# Patient Record
Sex: FEMALE | Race: WHITE | Employment: FULL TIME | URBAN - METROPOLITAN AREA
[De-identification: names, ages, dates, MRNs, and addresses within clinical notes are randomized per-mention and may not be internally consistent; named-entity substitution may affect disease eponyms.]

---

## 2017-02-15 ENCOUNTER — TELEPHONE (OUTPATIENT)
Dept: GASTROENTEROLOGY | Age: 42
End: 2017-02-15

## 2021-03-23 ENCOUNTER — HOSPITAL ENCOUNTER (EMERGENCY)
Age: 46
Discharge: HOME | End: 2021-03-23
Payer: COMMERCIAL

## 2021-03-23 VITALS
OXYGEN SATURATION: 100 % | DIASTOLIC BLOOD PRESSURE: 78 MMHG | SYSTOLIC BLOOD PRESSURE: 130 MMHG | HEART RATE: 82 BPM | RESPIRATION RATE: 18 BRPM | TEMPERATURE: 98.1 F
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
## 2021-03-23 DIAGNOSIS — M79.662 PAIN OF LEFT CALF: ICD-10-CM

## 2021-03-23 PROCEDURE — 99283 EMERGENCY DEPT VISIT LOW MDM: CPT

## 2021-03-23 RX ORDER — FLUCONAZOLE 150 MG/1
150 TABLET ORAL ONCE
Qty: 1 TABLET | Refills: 0 | Status: SHIPPED | OUTPATIENT
Start: 2021-03-23 | End: 2021-03-23

## 2021-03-23 RX ORDER — METHOCARBAMOL 500 MG/1
500 TABLET, FILM COATED ORAL 4 TIMES DAILY
Qty: 20 TABLET | Refills: 0 | Status: SHIPPED | OUTPATIENT
Start: 2021-03-23 | End: 2021-05-06

## 2021-03-23 RX ORDER — HYDROCODONE BITARTRATE AND ACETAMINOPHEN 5; 325 MG/1; MG/1
1 TABLET ORAL EVERY 6 HOURS PRN
Qty: 8 TABLET | Refills: 0 | Status: SHIPPED | OUTPATIENT
Start: 2021-03-23 | End: 2021-03-26

## 2021-03-23 ASSESSMENT — PAIN DESCRIPTION - PAIN TYPE: TYPE: ACUTE PAIN

## 2021-03-23 NOTE — ED PROVIDER NOTES
As physician-in-triage, I performed a medical screening history and physical exam on this patient. HISTORY OF PRESENT ILLNESS  Jason Delarosa is a 50 y.o. female presents to the emergency department with a dog bite wound to her left calf. States she was seen in urgent care when it first happened. States she is in the ED because she think she may be having a reaction to the antibiotic as she states she is having itching. She also states she feels like the wound looks mildly worse. She did have an updated tetanus shot when she was seen at urgent care. No fevers. .      PHYSICAL EXAM  /86   Pulse 88   Temp 98.1 °F (36.7 °C)   Resp 21   SpO2 100%     On exam, the patient appears in no acute distress. Speech is clear. Breathing is unlabored. Moves all extremities    Comment: Please note this report has been produced using speech recognition software and may contain errors related to that system including errors in grammar, punctuation, and spelling, as well as words and phrases that may be inappropriate. If there are any questions or concerns please feel free to contact the dictating provider for clarification.        Megan May MD  03/23/21 0649

## 2021-03-23 NOTE — ED PROVIDER NOTES
eMERGENCY dEPARTMENT eNCOUnter      PCP: No primary care provider on file. CHIEF COMPLAINT    Chief Complaint   [facility]   [facility]     left calf, 3/18/01663; was seen and treated on 3/19 on urgent care       [facility]    Jayesh Melo Sample is a 50 y.o. female who presents for evaluation of dog bite wound to left calf. Injury occurred 2 days ago and she was seen in urgent care when this happened. She was updated on her tetanus immunization and started on doxycycline and metronidazole. She has been taking these medications as prescribed. She states since then she has had continued pain in his left calf, some itching. She states that at times she will have radiating pain into her left upper leg. Symptoms worsen after standing on her feet and working for a long day. She has had no drainage from wounds. Denies significant redness or streaking of erythema. No fevers, nausea, vomiting. She states that bruising has worsened in the leg. Denies use of blood thinning medications. Patient also requesting a medication as she believes she is developing a yeast infection with above antibiotic use.       REVIEW OF SYSTEMS    Constitutional:  Denies fever, chills, weight loss or weakness   HENT:  Denies sore throat or ear pain   Cardiovascular:  Denies chest pain, palpitations   Respiratory:  Denies cough or shortness of breath    GI:  Denies abdominal pain, nausea, vomiting, or diarrhea  :  Denies any urinary symptoms    Musculoskeletal:  Denies back pain   Skin:  Denies rash  Neurologic:  Denies headache, focal weakness or sensory changes   Endocrine:  Denies polyuria or polydypsia   Lymphatic:  Denies swollen glands     All other review of systems are negative  See HPI and nursing notes for additional information     PAST MEDICAL AND SURGICAL HISTORY    Past Medical History:   Diagnosis Date    Asthma    [facility]     old chart also gives hx panic attacks    Gastritis 4/18/2012    Hiatal hernia organization: None     Attends meetings of clubs or organizations: None     Relationship status: None    Intimate partner violence     Fear of current or ex partner: None     Emotionally abused: None     Physically abused: None     Forced sexual activity: None   Other Topics Concern    None   Social History Narrative    None     Family History   Problem Relation Age of Onset    Asthma Father         and copd    Diabetes Father     Asthma Daughter          PHYSICAL EXAM    VITAL SIGNS: /86   Pulse 88   Temp 98.1 °F (36.7 °C)   Resp 21   SpO2 100%    Constitutional:  Well developed, Well nourished  HENT:  Normocephalic, Atraumatic, PERRL. EOMI. Sclera clear. Conjunctiva normal, No discharge. Neck/Lymphatics: supple, no JVD, no swollen nodes  Cardiovascular:  Normal heart rate, Normal rhythm, No murmurs  Respiratory:  Nonlabored breathing. Normal breath sounds, No wheezing  Abdomen: Bowel sounds normal, Soft, No tenderness, no masses. Musculoskeletal:   Left calf with several puncture wounds which appear to be healing well with overlying scabbing. Near the entire posterior medial aspect of left calf with bruising. Compartments are soft. Achilles tendon is intact clinically. Sensation intact throughout. Full range of motion of left knee and ankle. Dorsalis pedis and posterior tibial pulse 2+. Brisk capillary refill. Integument:  Warm, Dry  Neurologic: Alert & oriented , No focal deficits noted. Cranial nerves II through XII grossly intact. Normal gross motor coordination & motor strength bilateral upper and lower extremities  Sensation intact. Psychiatric:  Affect normal, Mood normal.       ED COURSE & MEDICAL DECISION MAKING       Vital signs and nursing notes reviewed during ED course. I have independently evaluated this patient . Supervising MD present in the Emergency Department, available for consultation, throughout entirety of  patient care.      Patient presents as above for recheck of dog bite wound to left calf. Wound does appear to be healing well, not see any evidence of infection. No crepitus streaking of erythema, surrounding induration or erythema. Achilles tendon is clinically intact. She is on any blood thinning medications. She states that the swelling has actually improved in her left calf and compartments are soft. Discussed importance of continuing antibiotics as prescribed. Will discharge with Diflucan, short course of pain medication, muscle relaxing medication. We discussed elevation of extremity and use of compression stockings while at work as standing on her feet throughout the day worsens the swelling and pain. She will need a repeat wound recheck in the next 2 to 3 days, however should immediately return with new or worsening symptoms or signs of systemic or localized infection. Patient agreeable with this plan and comfortable with discharge. The patient and/or the family were informed of the results of any tests/labs/imaging, the treatment plan, and time was allotted to answer questions. Clinical  IMPRESSION    1. Dog bite, subsequent encounter    2. Pain of left calf          Comment: Please note this report has been produced using speech recognition software and may contain errors related to that system including errors in grammar, punctuation, and spelling, as well as words and phrases that may be inappropriate. If there are any questions or concerns please feel free to contact the dictating provider for clarification.           FAUSTINO Moon  03/23/21 3193

## 2021-03-25 ENCOUNTER — OFFICE VISIT (OUTPATIENT)
Dept: PRIMARY CARE CLINIC | Age: 46
End: 2021-03-25
Payer: COMMERCIAL

## 2021-03-25 VITALS
HEART RATE: 100 BPM | DIASTOLIC BLOOD PRESSURE: 62 MMHG | TEMPERATURE: 97 F | WEIGHT: 201.4 LBS | BODY MASS INDEX: 34.38 KG/M2 | OXYGEN SATURATION: 99 % | SYSTOLIC BLOOD PRESSURE: 100 MMHG | HEIGHT: 64 IN
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
## 2021-03-25 PROCEDURE — 1036F TOBACCO NON-USER: CPT | Performed by: PHYSICIAN ASSISTANT

## 2021-03-25 PROCEDURE — G8484 FLU IMMUNIZE NO ADMIN: HCPCS | Performed by: PHYSICIAN ASSISTANT

## 2021-03-25 PROCEDURE — G8417 CALC BMI ABV UP PARAM F/U: HCPCS | Performed by: PHYSICIAN ASSISTANT

## 2021-03-25 PROCEDURE — G8427 DOCREV CUR MEDS BY ELIG CLIN: HCPCS | Performed by: PHYSICIAN ASSISTANT

## 2021-03-25 PROCEDURE — 99212 OFFICE O/P EST SF 10 MIN: CPT | Performed by: PHYSICIAN ASSISTANT

## 2021-03-25 RX ORDER — DOXYCYCLINE 100 MG/1
100 TABLET ORAL 2 TIMES DAILY
COMMUNITY
Start: 2021-03-18 | End: 2021-03-25

## 2021-03-25 RX ORDER — METRONIDAZOLE 500 MG/1
500 TABLET ORAL 3 TIMES DAILY
COMMUNITY
Start: 2021-03-18 | End: 2021-03-25

## 2021-03-25 ASSESSMENT — PATIENT HEALTH QUESTIONNAIRE - PHQ9
SUM OF ALL RESPONSES TO PHQ9 QUESTIONS 1 & 2: 0
SUM OF ALL RESPONSES TO PHQ QUESTIONS 1-9: 0
SUM OF ALL RESPONSES TO PHQ QUESTIONS 1-9: 0

## 2021-03-25 NOTE — PROGRESS NOTES
3/25/2021    Ashleyedeo Combe Sample    Chief Complaint   Patient presents with   [facility] ED Follow-up     dog bite       HPI  History was obtained from patient. Tim Crouch is a 50 y.o. female who presents today for follow-up on dog bite from 4 days ago. Patient was bitten on left calf by a neighborhood dog on 3/21/2021. She presented to an urgent care immediately following the bite and her tetanus was updated. She was placed on doxycycline and Flagyl. 2 days later, she returned to our emergency department due to itching of the leg and concerns for antibiotic induced yeast infection. She was sent home from our emergency room with Diflucan, Norco, Robaxin and told to continue dual therapy antibiotics. Patient states that she is still taking the antibiotics. She states that the wound has improved and her pain has lessened. Bruising present. She still has mild to moderate pain which is worse with weightbearing. She is back to work at [employer] and is on her feet for long periods of time. She denies fever, chills, nausea, vomiting, weakness, loss of sensation, cold skin, chest pain, dyspnea, cough, or any other complaints.      PAST MEDICAL HISTORY  Past Medical History:   Diagnosis Date    Asthma    [facility] Depression     old chart also gives hx panic attacks    Gastritis 4/18/2012    Hiatal hernia     Migraines     Ovarian cyst     Panic attacks        FAMILY HISTORY  Family History   Problem Relation Age of Onset    Asthma Father         and copd    Diabetes Father     Asthma Daughter        SOCIAL HISTORY  Social History     Socioeconomic History    Marital status: Single     Spouse name: None    Number of children: None    Years of education: None    Highest education level: None   Occupational History    None   Social Needs    Financial resource strain: None    Food insecurity     Worry: None     Inability: None    Transportation needs     Medical: None     Non-medical: None   Tobacco Use    Smoking status: Never Smoker    Smokeless tobacco: Never Used   Substance and Sexual Activity    Alcohol use: Yes     Alcohol/week: 2.0 standard drinks     Types: 2 Cans of beer per week     Comment: average- one or two times per month\"    Drug use: No    Sexual activity: Never   Lifestyle    Physical activity     Days per week: None     Minutes per session: None    Stress: None   Relationships    Social connections     Talks on phone: None     Gets together: None     Attends [employer] service: None     Active member of club or organization: None     Attends meetings of clubs or organizations: None     Relationship status: None    Intimate partner violence     Fear of current or ex partner: None     Emotionally abused: None     Physically abused: None     Forced sexual activity: None   Other Topics Concern    None   Social History Narrative    None        SURGICAL HISTORY  Past Surgical History:   Procedure Laterality Date    CHOLECYSTECTOMY  5/9/12    Laprascopic    HYSTERECTOMY      LAVH,cysto done 12/2008(pc)    TUBAL LIGATION  2000       CURRENT MEDICATIONS  Current Outpatient Medications   Medication Sig Dispense Refill    metroNIDAZOLE (FLAGYL) 500 MG tablet Take 500 mg by mouth 3 times daily      doxycycline monohydrate (ADOXA) 100 MG tablet Take 100 mg by mouth 2 times daily      HYDROcodone-acetaminophen (NORCO) 5-325 MG per tablet Take 1 tablet by mouth every 6 hours as needed for Pain for up to 3 days. 8 tablet 0    methocarbamol (ROBAXIN) 500 MG tablet Take 1 tablet by mouth 4 times daily As needed for muscle spasm. 20 tablet 0    ibuprofen (ADVIL;MOTRIN) 800 MG tablet Take 1 tablet by mouth every 6 hours as needed for Pain (Patient not taking: Reported on 3/25/2021) 120 tablet 3    omeprazole (PRILOSEC) 20 MG capsule Take 20 mg by mouth daily. No current facility-administered medications for this visit.         ALLERGIES  Allergies   Allergen Reactions    Lomotil occurred.     Electronically signed by Missy Storey PA-C on 3/25/2021

## 2021-03-25 NOTE — PATIENT INSTRUCTIONS
Apply ice intermittently never directly to the skin  Ibuprofen 800 mg every 8 hours as needed  Continue antibiotics until completion and pain medicine as needed  Elevate at home  Firm compress when weight bearing  Establish care with PCP ore return to clinic as needed      Patient Education        Animal Bites: Care Instructions  Your Care Instructions  After an animal bite, the biggest concern is infection. The chance of infection depends on the type of animal that bit you, where on your body you were bitten, and your general health. Many animal bites are not closed with stitches, because this can increase the chance of infection. Your bite may take as little as 7 days or as long as several months to heal, depending on how bad it is. Taking good care of your wound at home will help it heal and reduce your chance of infection. The doctor has checked you carefully, but problems can develop later. If you notice any problems or new symptoms, get medical treatment right away. Follow-up care is a key part of your treatment and safety. Be sure to make and go to all appointments, and call your doctor if you are having problems. It's also a good idea to know your test results and keep a list of the medicines you take. How can you care for yourself at home? · If your doctor told you how to care for your wound, follow your doctor's instructions. If you did not get instructions, follow this general advice:  ? After 24 to 48 hours, gently wash the wound with clean water 2 times a day. Do not scrub or soak the wound. Don't use hydrogen peroxide or alcohol, which can slow healing. ? You may cover the wound with a thin layer of petroleum jelly, such as Vaseline, and a nonstick bandage. ? Apply more petroleum jelly and replace the bandage as needed. · After you shower, gently dry the wound with a clean towel. · If your doctor has closed the wound, cover the bandage with a plastic bag before you take a shower.   · A small Information box to learn more about \"Animal Bites: Care Instructions. \"     If you do not have an account, please click on the \"Sign Up Now\" link. Current as of: February 26, 2020               Content Version: 12.8  © 4622-7623 [employer]. Care instructions adapted under license by [facility] [place] ([facility]). If you have questions about a medical condition or this instruction, always ask your healthcare professional. [address] any warranty or liability for your use of this information.

## 2021-04-28 ENCOUNTER — OFFICE VISIT (OUTPATIENT)
Dept: ORTHOPEDIC SURGERY | Age: 46
End: 2021-04-28
Payer: COMMERCIAL

## 2021-04-28 VITALS — HEIGHT: 60 IN | WEIGHT: 201 LBS | BODY MASS INDEX: 39.46 KG/M2 | HEART RATE: 89 BPM | OXYGEN SATURATION: 98 %

## 2021-04-28 DIAGNOSIS — M76.72 PERONEAL TENDONITIS OF LEFT LOWER LEG: Primary | ICD-10-CM

## 2021-04-28 PROCEDURE — 99202 OFFICE O/P NEW SF 15 MIN: CPT | Performed by: PHYSICIAN ASSISTANT

## 2021-04-28 PROCEDURE — G8417 CALC BMI ABV UP PARAM F/U: HCPCS | Performed by: PHYSICIAN ASSISTANT

## 2021-04-28 PROCEDURE — 1036F TOBACCO NON-USER: CPT | Performed by: PHYSICIAN ASSISTANT

## 2021-04-28 PROCEDURE — G8427 DOCREV CUR MEDS BY ELIG CLIN: HCPCS | Performed by: PHYSICIAN ASSISTANT

## 2021-04-28 NOTE — PATIENT INSTRUCTIONS
MRI left ankle  Take ibuprofen regularly, 2-3 times per day  Call office once MRI complete to get follow-up visit.

## 2021-04-28 NOTE — PROGRESS NOTES
Review of Systems   Constitutional: Negative for chills and fever. Musculoskeletal: Positive for gait problem and myalgias. HPI:Jaja Delarosa is a 50 y.o. female that presents for evaluation of left calf pain that radiates into her foot and up to her knee and hip. This is all secondary to a dog bite that occurred about 1 month ago. Her pain is constant but does fluctuate in intensity. It is 10/10 at its worst. It keeps her awake at night as she has difficulties getting comfortable. She works on her feet. She also has noticeable swelling. She states that most of her pain if she had to pinpoint it is along the lateral aspect of her ankle. She did seek treatment for the dog bite that started all this out and did get antibiotics and had her wounds taken care of in the emergency department. She denies any drainage from the bite sites. She denies fever or nausea. She denies any numbness. Past Medical History:   Diagnosis Date    Asthma    [facility] Depression     old chart also gives hx panic attacks    Gastritis 4/18/2012    Hemorrhagic ovarian cyst 3/12/2012    Hiatal hernia     Migraines     Ovarian cyst     Panic attacks     RLQ abdominal pain 3/12/2012       Past Surgical History:   Procedure Laterality Date    CHOLECYSTECTOMY  5/9/12    Laprascopic    HYSTERECTOMY      LAVH,cysto done 12/2008(pc)    TUBAL LIGATION  2000       Family History   Problem Relation Age of Onset    Asthma Father         and copd    Diabetes Father     Asthma Daughter        Social History     Socioeconomic History    Marital status:      Spouse name: Not on file    Number of children: Not on file    Years of education: Not on file    Highest education level: Not on file   Occupational History    Not on file   Tobacco Use    Smoking status: Never Smoker    Smokeless tobacco: Never Used   Substance and Sexual Activity    Alcohol use:  Yes     Alcohol/week: 2.0 standard drinks     Types: 2 Cans of beer per week     Comment: average- one or two times per month\"    Drug use: No    Sexual activity: Never   Other Topics Concern    Not on file   Social History Narrative    Not on file     Social Determinants of Health     Financial Resource Strain:     Difficulty of Paying Living Expenses:    Food Insecurity:     Worried About Running Out of Food in the Last Year:     [address] in the Last Year:    Transportation Needs:     Lack of Transportation (Medical):  Lack of Transportation (Non-Medical):    Physical Activity:     Days of Exercise per Week:     Minutes of Exercise per Session:    Stress:     Feeling of Stress :    Social Connections:     Frequency of Communication with Friends and Family:     Frequency of Social Gatherings with Friends and Family:     Attends Yarsani Services:     Active Member of Clubs or Organizations:     Attends Club or Organization Meetings:     Marital Status:    Intimate Partner Violence:     Fear of Current or Ex-Partner:     Emotionally Abused:     Physically Abused:     Sexually Abused:        Current Outpatient Medications   Medication Sig Dispense Refill    ibuprofen (ADVIL;MOTRIN) 200 MG tablet Take 200 mg by mouth every 6 hours as needed for Pain       No current facility-administered medications for this visit. Allergies   Allergen Reactions    Lomotil [Diphenoxylate-Atropine] Hives    Pcn [Penicillins] Hives       Review of Systems:  See above      Physical Exam:   Pulse 89   Ht 5' (1.524 m)   Wt 201 lb (91.2 kg)   SpO2 98%   BMI 39.26 kg/m²        Gait is Antalgic. Gen/Psych:Examination reveals a pleasant individual in no acute distress. The patient is oriented to time, place and person. The patient's mood and affect are appropriate. Patient appears well nourished.  Body habitus is overweight     Lymph:  no lymphedema in bilateral lower extremities     Skin intact in bilateral lower extremities with no ulcerations, lesions,

## 2021-05-06 ENCOUNTER — OFFICE VISIT (OUTPATIENT)
Dept: FAMILY MEDICINE CLINIC | Age: 46
End: 2021-05-06
Payer: COMMERCIAL

## 2021-05-06 ENCOUNTER — HOSPITAL ENCOUNTER (OUTPATIENT)
Dept: MRI IMAGING | Age: 46
Discharge: HOME | End: 2021-05-06
Payer: COMMERCIAL

## 2021-05-06 VITALS
BODY MASS INDEX: 39.89 KG/M2 | OXYGEN SATURATION: 98 % | HEART RATE: 78 BPM | WEIGHT: 203.2 LBS | SYSTOLIC BLOOD PRESSURE: 126 MMHG | DIASTOLIC BLOOD PRESSURE: 74 MMHG | TEMPERATURE: 97 F | HEIGHT: 60 IN

## 2021-05-06 DIAGNOSIS — F41.9 ANXIETY AND DEPRESSION: Primary | ICD-10-CM

## 2021-05-06 DIAGNOSIS — F32.A ANXIETY AND DEPRESSION: Primary | ICD-10-CM

## 2021-05-06 DIAGNOSIS — K29.50 CHRONIC GASTRITIS WITHOUT BLEEDING, UNSPECIFIED GASTRITIS TYPE: ICD-10-CM

## 2021-05-06 DIAGNOSIS — Z13.220 SCREENING FOR LIPID DISORDERS: ICD-10-CM

## 2021-05-06 DIAGNOSIS — M76.72 PERONEAL TENDONITIS OF LEFT LOWER LEG: ICD-10-CM

## 2021-05-06 DIAGNOSIS — J45.20 MILD INTERMITTENT ASTHMA WITHOUT COMPLICATION: ICD-10-CM

## 2021-05-06 DIAGNOSIS — E66.9 CLASS 2 OBESITY WITHOUT SERIOUS COMORBIDITY WITH BODY MASS INDEX (BMI) OF 39.0 TO 39.9 IN ADULT, UNSPECIFIED OBESITY TYPE: ICD-10-CM

## 2021-05-06 DIAGNOSIS — S81.852S DOG BITE OF CALF, LEFT, SEQUELA: ICD-10-CM

## 2021-05-06 DIAGNOSIS — Z13.1 SCREENING FOR DIABETES MELLITUS: ICD-10-CM

## 2021-05-06 DIAGNOSIS — K21.9 GASTROESOPHAGEAL REFLUX DISEASE WITHOUT ESOPHAGITIS: ICD-10-CM

## 2021-05-06 DIAGNOSIS — K44.9 HIATAL HERNIA: ICD-10-CM
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
## 2021-05-06 LAB
A/G RATIO: 1.7 (ref 1.1–2.2)
ALBUMIN SERPL-MCNC: 4.7 G/DL (ref 3.4–5)
ALP BLD-CCNC: 44 U/L (ref 40–129)
ALT SERPL-CCNC: 18 U/L (ref 10–40)
ANION GAP SERPL CALCULATED.3IONS-SCNC: 10 MMOL/L (ref 3–16)
AST SERPL-CCNC: 15 U/L (ref 15–37)
BILIRUB SERPL-MCNC: 0.3 MG/DL (ref 0–1)
BUN BLDV-MCNC: 17 MG/DL (ref 7–20)
CALCIUM SERPL-MCNC: 9 MG/DL (ref 8.3–10.6)
CHLORIDE BLD-SCNC: 102 MMOL/L (ref 99–110)
CHOLESTEROL, TOTAL: 166 MG/DL (ref 0–199)
CO2: 26 MMOL/L (ref 21–32)
CREAT SERPL-MCNC: 0.5 MG/DL (ref 0.6–1.1)
GFR AFRICAN AMERICAN: >60
GFR NON-AFRICAN AMERICAN: >60
GLOBULIN: 2.8 G/DL
GLUCOSE BLD-MCNC: 95 MG/DL (ref 70–99)
HDLC SERPL-MCNC: 47 MG/DL (ref 40–60)
LDL CHOLESTEROL CALCULATED: 105 MG/DL
POTASSIUM SERPL-SCNC: 4.5 MMOL/L (ref 3.5–5.1)
SODIUM BLD-SCNC: 138 MMOL/L (ref 136–145)
TOTAL PROTEIN: 7.5 G/DL (ref 6.4–8.2)
TRIGL SERPL-MCNC: 70 MG/DL (ref 0–150)
VLDLC SERPL CALC-MCNC: 14 MG/DL

## 2021-05-06 PROCEDURE — 73721 MRI JNT OF LWR EXTRE W/O DYE: CPT

## 2021-05-06 PROCEDURE — 99204 OFFICE O/P NEW MOD 45 MIN: CPT | Performed by: NURSE PRACTITIONER

## 2021-05-06 PROCEDURE — 1036F TOBACCO NON-USER: CPT | Performed by: NURSE PRACTITIONER

## 2021-05-06 PROCEDURE — G8427 DOCREV CUR MEDS BY ELIG CLIN: HCPCS | Performed by: NURSE PRACTITIONER

## 2021-05-06 PROCEDURE — G8417 CALC BMI ABV UP PARAM F/U: HCPCS | Performed by: NURSE PRACTITIONER

## 2021-05-06 RX ORDER — IBUPROFEN 200 MG
200 TABLET ORAL EVERY 6 HOURS PRN
COMMUNITY

## 2021-05-06 ASSESSMENT — ENCOUNTER SYMPTOMS
DIARRHEA: 0
COUGH: 0
CONSTIPATION: 0
NAUSEA: 0
BACK PAIN: 0
VOMITING: 0
ABDOMINAL PAIN: 0
BLOOD IN STOOL: 0
SHORTNESS OF BREATH: 0

## 2021-05-06 NOTE — PROGRESS NOTES
2021     [facility] Sample (:  1975) is a 50 y.o. female, here for evaluation of the following medical concerns:    Presents to establish care today:  Medical history of:    Chronic intermittent vertigo- none curent. Mild intermittent asthma without exacerbation. Does not use an inhaler. Denies need. Does not see pulmonology. Cholecystectomy, gastritis, GERD, hiatal hernia, intermittent diarrhea and abdominal pain. Has not seen GI since for cholecystectomy. Does not want to see GI. Denies blood in bowels, nausea, vomiting. Chronic migrainesnone recent. Never been on a daily medication. Do not happen daily or weekly. Anxiety and depressiondenies need for therapy or medication. Is never been medicated before. Denies suicidal thought plan idea trouble sleeping. States she has heart flutter sometimes when she has sinus colds. Could be related to cold medicine. Does not want to see cardiology. Denies chest pain, shortness of breath. Only medication she is taking is as needed Motrin. Dog bite 3/18/21- following with Dr. Joyce Stone. Left calf- pain and swelling. Now having ankle pain. MRI today. Wants to start mammos at 48           Review of Systems   Constitutional: Negative for activity change, appetite change, chills, diaphoresis, fatigue, fever and unexpected weight change. Eyes: Negative for visual disturbance. Respiratory: Negative for cough and shortness of breath. Cardiovascular: Negative for chest pain, palpitations and leg swelling. Gastrointestinal: Negative for abdominal pain, blood in stool, constipation, diarrhea, nausea and vomiting. Intermittent abd pain and diarrhea. Genitourinary: Negative for difficulty urinating and menstrual problem. Musculoskeletal: Positive for arthralgias. Negative for back pain and gait problem. Left leg pain    Skin: Negative. Neurological: Positive for headaches.  Negative for dizziness, weakness, light-headedness and numbness. Psychiatric/Behavioral: Positive for dysphoric mood. Negative for sleep disturbance and suicidal ideas. The patient is nervous/anxious. Prior to Visit Medications    Medication Sig Taking? Authorizing Provider   ibuprofen (ADVIL;MOTRIN) 200 MG tablet Take 200 mg by mouth every 6 hours as needed for Pain Yes Historical Provider, MD        Social History     Tobacco Use    Smoking status: Never Smoker    Smokeless tobacco: Never Used   Substance Use Topics    Alcohol use: Yes     Alcohol/week: 2.0 standard drinks     Types: 2 Cans of beer per week     Comment: average- one or two times per month\"        Vitals:    05/06/21 0812   BP: 126/74   Site: Left Upper Arm   Position: Sitting   Cuff Size: Large Adult   Pulse: 78   Temp: 97 °F (36.1 °C)   SpO2: 98%   Weight: 203 lb 3.2 oz (92.2 kg)   Height: 5' (1.524 m)     Estimated body mass index is 39.68 kg/m² as calculated from the following:    Height as of this encounter: 5' (1.524 m). Weight as of this encounter: 203 lb 3.2 oz (92.2 kg). Physical Exam  Vitals signs reviewed. Constitutional:       General: She is not in acute distress. Appearance: Normal appearance. She is normal weight. She is not ill-appearing, toxic-appearing or diaphoretic. HENT:      Head: Normocephalic and atraumatic. Nose: Nose normal.   Eyes:      Extraocular Movements: Extraocular movements intact. Pupils: Pupils are equal, round, and reactive to light. Neck:      Musculoskeletal: Normal range of motion and neck supple. Cardiovascular:      Rate and Rhythm: Normal rate and regular rhythm. Heart sounds: Normal heart sounds. Pulmonary:      Effort: Pulmonary effort is normal.      Breath sounds: Normal breath sounds. Abdominal:      General: Bowel sounds are normal. There is no distension. Palpations: Abdomen is soft. There is no mass. Tenderness: There is no abdominal tenderness.       Hernia: No hernia is made to ensure the accuracy of this automated transcription, some errors in transcription may have occurred. No follow-ups on file. An electronic signature was used to authenticate this note.     --KUSHAL Hairston NP on 5/6/2021 at 9:11 AM

## 2021-05-17 ENCOUNTER — OFFICE VISIT (OUTPATIENT)
Dept: ORTHOPEDIC SURGERY | Age: 46
End: 2021-05-17
Payer: COMMERCIAL

## 2021-05-17 VITALS — BODY MASS INDEX: 39.85 KG/M2 | WEIGHT: 203 LBS | RESPIRATION RATE: 16 BRPM | HEIGHT: 60 IN

## 2021-05-17 DIAGNOSIS — G89.29 CHRONIC PAIN OF LEFT ANKLE: Primary | ICD-10-CM

## 2021-05-17 DIAGNOSIS — M25.572 CHRONIC PAIN OF LEFT ANKLE: Primary | ICD-10-CM

## 2021-05-17 DIAGNOSIS — M76.72 PERONEAL TENDONITIS OF LEFT LOWER LEG: ICD-10-CM

## 2021-05-17 PROCEDURE — 1036F TOBACCO NON-USER: CPT | Performed by: PHYSICIAN ASSISTANT

## 2021-05-17 PROCEDURE — G8427 DOCREV CUR MEDS BY ELIG CLIN: HCPCS | Performed by: PHYSICIAN ASSISTANT

## 2021-05-17 PROCEDURE — 99212 OFFICE O/P EST SF 10 MIN: CPT | Performed by: PHYSICIAN ASSISTANT

## 2021-05-17 PROCEDURE — G8417 CALC BMI ABV UP PARAM F/U: HCPCS | Performed by: PHYSICIAN ASSISTANT

## 2021-05-17 NOTE — PROGRESS NOTES
Jaleel De León is a 50 y.o. female who presents the office today to go over MRI of her left ankle. She continues to have left ankle pain along the lateral aspect of the left ankle. She has had repetitive sprains in the past.      Past Medical History:   Diagnosis Date    Asthma     Depression     old chart also gives hx panic attacks    Gastritis 4/18/2012    Hemorrhagic ovarian cyst 3/12/2012    Hiatal hernia     Migraines     Ovarian cyst     Panic attacks     RLQ abdominal pain 3/12/2012       Past Surgical History:   Procedure Laterality Date    CHOLECYSTECTOMY  5/9/12    Laprascopic    HYSTERECTOMY      LAVH,cysto done 12/2008(pc)    TUBAL LIGATION  2000       Family History   Problem Relation Age of Onset    Asthma Father         and copd    Diabetes Father     Asthma Daughter        Social History     Socioeconomic History    Marital status:      Spouse name: None    Number of children: None    Years of education: None    Highest education level: None   Occupational History    None   Tobacco Use    Smoking status: Never Smoker    Smokeless tobacco: Never Used   Substance and Sexual Activity    Alcohol use: Yes     Alcohol/week: 2.0 standard drinks     Types: 2 Cans of beer per week     Comment: average- one or two times per month\"    Drug use: No    Sexual activity: Never   Other Topics Concern    None   Social History Narrative    None     Social Determinants of Health     Financial Resource Strain:     Difficulty of Paying Living Expenses:    Food Insecurity:     Worried About Running Out of Food in the Last Year:     Ran Out of Food in the Last Year:    Transportation Needs:     Lack of Transportation (Medical):      Lack of Transportation (Non-Medical):    Physical Activity:     Days of Exercise per Week:     Minutes of Exercise per Session:    Stress:     Feeling of Stress :    Social Connections:     Frequency of Communication with Friends and Family:     Frequency of Social Gatherings with Friends and Family:     Attends [employer] Services:     Active Member of Clubs or Organizations:     Attends Club or Organization Meetings:     Marital Status:    Intimate Partner Violence:     Fear of Current or Ex-Partner:     Emotionally Abused:     Physically Abused:     Sexually Abused:        Current Outpatient Medications   Medication Sig Dispense Refill    ibuprofen (ADVIL;MOTRIN) 200 MG tablet Take 200 mg by mouth every 6 hours as needed for Pain       No current facility-administered medications for this visit. Allergies   Allergen Reactions    Lomotil [Diphenoxylate-Atropine] Hives    Pcn [Penicillins] Hives       Review of Systems:  See above      Physical Exam:   Resp 16   Ht 5' (1.524 m)   Wt 203 lb (92.1 kg)   BMI 39.65 kg/m²        Gait is Normal.       Gen/Psych:Examination reveals a pleasant individual in no acute distress. The patient is oriented to time, place and person. The patient's mood and affect are appropriate. Patient appears well nourished. Body habitus is overweight     Lymph:  No lymphedema in bilateral lower extremities     Skin intact in bilateral lower extremities with no ulcerations, lesions, rash, erythema. Vascular: There are no varicosities in bilateral lower extremities, sensation intact to light touch over bilateral lower extremities. Imaging studies:  MRI: Left ankle  Impression   No peroneal tendon pathology.       Mild chronic plantar fasciitis.       Likely synovial versus ganglion cyst to the dorsolateral aspect of the   midfoot at the level of the talonavicular joint.       Intact but mildly thickened spring ligament without other abnormality. Findings may be on the basis of prior remote injury or chronic repetitive   microtrauma. Impression:     Diagnosis Orders   1. Chronic pain of left ankle     2.  Peroneal tendonitis of left lower leg  [facility]           Plan:      Patient Instructions   Work on home exercises with resistance bands after going to physical therapy for evaluation. If still having pain or no improvement after 3-4 weeks then call office and get in with Dr. Agata Pak.

## 2021-05-17 NOTE — PATIENT INSTRUCTIONS
Work on home exercises with resistance bands after going to physical therapy for evaluation. If still having pain or no improvement after 3-4 weeks then call office and get in with Dr. Donya Beal.

## 2021-05-19 ENCOUNTER — OFFICE VISIT (OUTPATIENT)
Dept: ENT CLINIC | Age: 46
End: 2021-05-19

## 2021-05-19 VITALS — HEART RATE: 87 BPM | SYSTOLIC BLOOD PRESSURE: 110 MMHG | DIASTOLIC BLOOD PRESSURE: 76 MMHG | TEMPERATURE: 98.4 F

## 2021-05-19 DIAGNOSIS — L90.5 SCAR: Primary | ICD-10-CM

## 2021-05-19 PROCEDURE — 99999 PR OFFICE/OUTPT VISIT,PROCEDURE ONLY: CPT | Performed by: OTOLARYNGOLOGY

## 2021-05-19 NOTE — PROGRESS NOTES
Patient relates that she was bitten by a dog on March 18 of 2021. She was bitten on the left inner aspect of her calf. She went to an urgent care facility where a tetanus toxoid was given. 2 days later after having been on amoxicillin, she noted an extensive amount of discomfort in the area and went to an emergency department who suggested that she try a muscle relaxant as well as some pain medication. She has had no prior history of injury to the area involved antedating above incident. She has had no surgical intervention. She has seen an orthopedic surgeon due to tenderness in the area underlying the bite marks and it was suggested that she continue ibuprofen and monitoring. Currently examination reveals 3 separate bite marks measuring approximately 3 to 4 mm in diameter. They are located on the medial aspect of her cough on the right left side more posteriorly. Underlying 2 of them, there is obvious tenderness and probable scar tissue likely related to muscle injury. I have suggested that those areas are likely to improve with time. It is unlikely that surgical intervention would be beneficial in terms of cosmetic appearance.

## 2021-05-19 NOTE — LETTER
Farhan Sanchez at law  [address] [place] [address]., Jose. [address] [facility], [address] [facility]    Dear Mr. Bradley, I have had the opportunity of evaluating your [facility]. On that date, she related having been bitten by a dog on March 18 of 2021. She was bitten on the inner aspect of her left lower leg. She was seen in urgent care facility at which point a tetanus toxoid injection was given but no other therapy. Two days later, she had been placed on amoxicillin and noted an extensive amount of discomfort in the area and therefore went to an emergency department which suggested that she try a muscle relaxant as well as some pain medication. Gradually, the situation improved but she continued to have discomfort in the area as well as scarring. She had no surgical intervention. She had had no prior history of injury to the area involved antedating the above incident. She has seen by an orthopedic surgeon due to the underlying painful swelling. She was treated with a muscle relaxant and ibuprofen. My examination revealed three separate bite marks measuring approximately 3 to 4 mm in diameter each. They were located on the medial aspect of her left calf. They were noted to be present somewhat more posteriorly. Underlying two of them, there was slight tenderness and swelling indicating obvious injury to underlying soft tissues and muscle tissue. No infection was noted. My evaluation indicated that with a reasonable degree of medical certainty, there was definite causal relationship between the scars described and the dog bite injury that occurred on March 18, 2021. It is now my medical opinion, once again with a reasonable degree of medical certainty, that the scars are permanent. The underlying tissue discomfort should gradually reena with time. This may take one or two months before this may happen. No specific therapy is available for this.   Unfortunately, the scarring is in an area in which it would be very difficult to obtain a cosmetic benefit from any further surgery. Therefore, it is not suggested that surgery be indicated for this current condition. Permanent scarring, therefore, would be inevitable. If I can be of any further assistance, please do not hesitate to contact me. I remain,    Sincerely yours,        Luis Garzon. Óscar Monreal M.D.  Valeria Bird.   [place], [address] [place] [address]

## 2021-05-24 ENCOUNTER — E-VISIT (OUTPATIENT)
Dept: FAMILY MEDICINE CLINIC | Age: 46
End: 2021-05-24
Payer: COMMERCIAL

## 2021-05-24 DIAGNOSIS — N30.01 ACUTE CYSTITIS WITH HEMATURIA: Primary | ICD-10-CM

## 2021-05-24 PROCEDURE — 98970 NQHP OL DIG ASSMT&MGMT 5-10: CPT | Performed by: NURSE PRACTITIONER

## 2021-05-24 RX ORDER — NITROFURANTOIN 25; 75 MG/1; MG/1
100 CAPSULE ORAL 2 TIMES DAILY
Qty: 20 CAPSULE | Refills: 0 | Status: SHIPPED | OUTPATIENT
Start: 2021-05-24 | End: 2021-06-03

## 2021-05-24 NOTE — PROGRESS NOTES
KUSHAL Armas-CNP  [address] [place] [address]  [address] [address]  [address] [address]  Dept: [phone]  Dept Fax: [phone]:    1. Acute cystitis with hematuria  - Increase water/cranberry juice intake,   - Monitor self closely for fever, chills  - nitrofurantoin, macrocrystal-monohydrate, (MACROBID) 100 MG capsule; Take 1 capsule by mouth 2 times daily for 10 days  Dispense: 20 capsule; Refill: 0    5-10 minutes were spent on the digital evaluation and management of this patient.      KUSHAL Armas-CNP

## 2021-06-09 ENCOUNTER — HOSPITAL ENCOUNTER (OUTPATIENT)
Dept: PHYSICAL THERAPY | Age: 46
Setting detail: THERAPIES SERIES
Discharge: HOME | End: 2021-06-09
Payer: COMMERCIAL

## 2021-06-09 PROCEDURE — 97110 THERAPEUTIC EXERCISES: CPT

## 2021-06-09 PROCEDURE — 97535 SELF CARE MNGMENT TRAINING: CPT

## 2021-06-09 PROCEDURE — 97162 PT EVAL MOD COMPLEX 30 MIN: CPT

## 2021-06-09 ASSESSMENT — PAIN DESCRIPTION - ONSET: ONSET: AWAKENED FROM SLEEP

## 2021-06-09 ASSESSMENT — PAIN SCALES - GENERAL: PAINLEVEL_OUTOF10: 4

## 2021-06-09 ASSESSMENT — PAIN DESCRIPTION - DESCRIPTORS: DESCRIPTORS: ACHING;THROBBING

## 2021-06-09 ASSESSMENT — PAIN DESCRIPTION - ORIENTATION: ORIENTATION: LEFT

## 2021-06-09 ASSESSMENT — PAIN DESCRIPTION - LOCATION: LOCATION: ANKLE

## 2021-06-09 ASSESSMENT — PAIN - FUNCTIONAL ASSESSMENT: PAIN_FUNCTIONAL_ASSESSMENT: PREVENTS OR INTERFERES SOME ACTIVE ACTIVITIES AND ADLS

## 2021-06-09 ASSESSMENT — PAIN DESCRIPTION - PAIN TYPE: TYPE: CHRONIC PAIN

## 2021-06-09 ASSESSMENT — PAIN DESCRIPTION - FREQUENCY: FREQUENCY: CONTINUOUS

## 2021-06-09 NOTE — FLOWSHEET NOTE
Outpatient Physical Therapy  [place]           [x] Phone: [phone]   Fax: [phone]  [place]           [] Phone: [phone]   Fax: [phone]        Physical Therapy Daily Treatment Note  Date:  2021    Patient Name:  Meagan Nelson    :  1975  MRN: [id number]  Restrictions/Precautions: Other position/activity restrictions: No formal restrictions  Diagnosis:   Diagnosis: Peroneal tendonitis LLE  Date of Injury/Surgery: 3/18/21  Treatment Diagnosis: Treatment Diagnosis: left ankle pain, swelling, impaired ROM/strength L ankle, antalgic gait    Insurance/Certification information: PT Insurance Information: [facility] [place] (NO REIMBURSEMENT FOR DNT)  Referring Physician:  Referring Practitioner: Chevy Martinez PA-C  Next Doctor Visit:  Unknown  Plan of care signed (Y/N):  Pending  Outcome Measure: LEFS 26.25% disability  Visit# / total visits:     Pain level: 4/10    POC date range 21 - 21     Goals:          Long term goals  Time Frame for Long term goals : In 4 weeks, patient will  Long term goal 1: demonstrate compliance and independence w/HEP. Long term goal 2: score at <= 10% disability on LEFS as indication of functional improvement w/daily activities. Long term goal 3: ascend/descend (1) flight of stairs, using (1) HR light support, reciprocal pattern w/o pain or observable weakness. Long term goal 4: ambulate facility ad community surfaces x at least 1,000 feet w/normalized gait. Summary of Evaluation: Assessment: Patient reports being bit by a dog in March. Had swelling and pain, was on antibiotics and muscle relaxers. Since the injury, has developed foot pain which is not resolving. PLOF:  Ind. 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   Current LOF:  Still performing at independent level, all previously mentioned acitivities, w/pain left ankle, impaired walking quality and distance, has to take more sitting breaks, swelling off/on. Today, patient presents w/symptoms consistent w/peroneal tendonitis and will benefit from physical therapy. Subjective:  See eval         Any changes in Ambulatory Summary Sheet? None        Objective:  See eval     Prior to today's treatment session, patient was screened for signs and symptoms related to COVID-19 including but not limited to verbally answering questions related to feeling ill, cough, or SOB, along with taking temperature via forehead thermometer. Patient presented with all negative signs and symptoms and had no fever >100 degrees Fahrenheit this date. Exercises: (No more than 4 columns)   Exercise/Equipment Date 6/09/21 #1 Date Date           WARM UP                     TABLE      Toe curls/towel gathering 3'     Isometric ankle  PF submax x 5  EV submax x 5  INV x 5  DF x 5                          STANDING                                                     PROPRIOCEPTION                                    MODALITIES                      Other Therapeutic Activities/Education:    POC and treatment rationale/progression reviewed w/and accepted by patient. Discussed possible benefit of OTC foot orthotics d/t pes planus. Ice after exercise 10-15 min prn. Home Exercise Program:    Toe curls  Ankle isometrics    Manual Treatments:    NO    Modalities:    NO    Communication with other providers:    POC faxed to ordering provider 6/09/21    Assessment:  (Response towards treatment session) (Pain Rating)  End pain 4/10  Demonstrates good understanding of HEP. Assessment: Patient reports being bit by a dog in March. Had swelling and pain, was on antibiotics and muscle relaxers. Since the injury, has developed foot pain which is not resolving. PLOF:  Ind. 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   Current LOF:  Still performing at

## 2021-06-09 NOTE — PROGRESS NOTES
Physical Therapy  Initial Assessment  Date: 2021  Patient Name: Fransisco Cuevas  MRN: [id number]  : 1975     Treatment Diagnosis: left ankle pain, swelling, impaired ROM/strength L ankle, antalgic gait    Restrictions  Position Activity Restriction  Other position/activity restrictions: No formal restrictions    Subjective   General  Chart Reviewed: Yes  Patient assessed for rehabilitation services?: Yes  Additional Pertinent Hx: PMH:  hernia, depression, panic attacks, asthma (seasonal), migraines, gastritis  Family / Caregiver Present: No  Referring Practitioner: Jagdeep Veras PA-C  Referral Date : 21  Diagnosis: Peroneal tendonitis LLE  Follows Commands: Within Functional Limits  PT Visit Information  Onset Date: 21  PT Insurance Information: Medical [place]  Total # of Visits Approved: 20 (visits per year/hard max)  Subjective  Subjective: Patient reports being bit by a dog in March. Had swelling and pain, was on antibiotics and muscle relaxers. Since the injury, has developed foot pain which is not resolving. PLOF:  Ind. 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 Current LOF:  Still performing at independent level, all previously mentioned acitivities, w/pain left ankle, impaired walking quality and distance, has to take more sitting breaks, swelling off/on. Pain Screening  Patient Currently in Pain: Yes  Pain Assessment  Pain Assessment: 0-10  Pain Level: 4  Patient's Stated Pain Goal: No pain  Pain Type: Chronic pain  Pain Location: Ankle  Pain Orientation: Left  Pain Radiating Towards: ankle to outer left foot  Pain Descriptors: Aching; Throbbing  Pain Frequency: Continuous (calf pain at bite area is intermittent and sharp)  Pain Onset: Awakened from sleep  Functional Pain Assessment: Prevents or interferes some active activities and ADLs  Vital Signs  Patient Currently in Pain: Yes    Vision/Hearing  Vision  Vision: Within Functional Limits  Hearing  Hearing: Within functional limits    Orientation  Orientation  Overall Orientation Status: Within Normal Limits    Social/Functional History  Social/Functional History  Lives With: Spouse  Type of Home: House  Home Layout: Two level; Laundry in basement (rails)  Home Access: Stairs to enter with rails  Entrance Stairs - Number of Steps: 6 shahab  Bathroom Shower/Tub: Tub/Shower unit  ADL Assistance: Independent  Homemaking Assistance: Independent  Homemaking Responsibilities: Yes  Ambulation Assistance: Independent  Transfer Assistance: Independent  Active : Yes  Mode of Transportation: Car  Occupation: Full time employment  Type of occupation: restraunt manager    Objective     Observation/Palpation  Observation: antalgic gait    AROM LLE (degrees)  LLE AROM : Exceptions  L Ankle Dorsiflexion 0-20: 0-6 w/discomfort dorsum of foot first flare  L Ankle Plantar Flexion 0-45: 0-60 w/discomfort dorsum of foot and right posterior ankle  L Ankle Forefoot Inversion 0-40: 0-40 w/lateral ankle pain  L Ankle Forefoot Eversion 0-20: 0-28 no pain    Strength RLE  Strength RLE: WNL  Strength LLE  Strength LLE: Exception  L Ankle Dorsiflexion: 4/5  L Ankle Plantar Flexion: 3-/5  L Ankle Inversion: 3-/5  L Ankle Eversion: 2+/5  L Great Toe Extension: 5/5     Additional Measures  Girth: Figure 8:  L ankle:  57 cm    R ankle:  56 cm  Special Tests: unable to walk on toes LLE  Sensation  Overall Sensation Status: WFL     Transfers  Sit to Stand: Modified independent  Stand to sit: Modified independent       Ambulation  Ambulation?: Yes  Ambulation 1  Device: No Device  Assistance: Independent  Quality of Gait: mildly antalgic  Distance: 100 ft x 2                            Assessment   Conditions Requiring Skilled Therapeutic Intervention  Body structures, Functions, Activity limitations: Decreased strength;Decreased ROM; Decreased balance; Increased pain;Decreased functional mobility ; Decreased ADL status  Assessment: Patient reports being bit by a dog in March. Had swelling and pain, was on antibiotics and muscle relaxers. Since the injury, has developed foot pain which is not resolving. PLOF:  Ind. 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 Current LOF:  Still performing at independent level, all previously mentioned acitivities, w/pain left ankle, impaired walking quality and distance, has to take more sitting breaks, swelling off/on. Today, patient presents w/symptoms consistent w/peroneal tendonitis and will benefit from physical therapy. Treatment Diagnosis: left ankle pain, swelling, impaired ROM/strength L ankle, antalgic gait  Prognosis: Good  Decision Making: Medium Complexity  History: PMH:  hernia, depression, panic attacks, asthma (seasonal), migraines, gastritis  Exam: MMT, ROM, palpation, gait, LEFS  Clinical Presentation: medium complexity  Barriers to Learning: None  REQUIRES PT FOLLOW UP: Yes  Activity Tolerance  Activity Tolerance: Patient Tolerated treatment well         Plan   Plan  Times per week: 2x/wk x 2 weeks, then 1x/wk x 2 weeks  Current Treatment Recommendations: Strengthening, ROM, Balance Training, Functional Mobility Training, Stair training, Gait Training, Neuromuscular Re-education, Manual Therapy - Soft Tissue Mobilization, Manual Therapy - Joint Manipulation, Pain Management, Modalities, Patient/Caregiver Education & Training, Home Exercise Program, Integrated Dry Needling    G-Code       OutComes Score                                                  AM-PAC Score             Goals  Long term goals  Time Frame for Long term goals : In 4 weeks, patient will  Long term goal 1: demonstrate compliance and independence w/HEP.   Long term goal 2: score at <= 10% disability on LEFS as indication of functional improvement w/daily

## 2021-06-09 NOTE — PLAN OF CARE
Outpatient Physical Therapy        [] Phone: [phone]   Fax: [phone]   Pediatric Therapy          [] Phone: [phone]   Fax: [phone]  [facility]          [] Phone: [phone]   Fax: [phone]      To: Referring Practitioner: Alison Barry PA-C    From: Olena Wills PT, PT     Patient: Eric Dutta         : 1975  Diagnosis: Peroneal tendonitis LLE   Treatment Diagnosis: left ankle pain, swelling, impaired ROM/strength L ankle, antalgic gait   Date: 2021    Physical Therapy Certification/Re-Certification Form  Dear Bashir Iyer,  The following patient has been evaluated for physical therapy services and for therapy to continue, Please review the attached evaluation and/or summary of the patient's plan of care, and verify that you agree therapy should continue by signing the attached document and sending it back to our office. Assessment:  Patient reports being bit by a dog in March. Had swelling and pain, was on antibiotics and muscle relaxers. Since the injury, has developed foot pain which is not resolving. PLOF:  Ind. 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 Current LOF:  Still performing at independent level, all previously mentioned acitivities, w/pain left ankle, impaired walking quality and distance, has to take more sitting breaks, swelling off/on. Today, patient presents w/symptoms consistent w/peroneal tendonitis and will benefit from physical therapy.     Planned Services:  [x] Therapeutic Exercise    [] Aquatics:  [x] Therapeutic Activity    [x] Ultrasound  [x] Elec Stimulation  [x] Gait Training     [] Cervical Traction [] Lumbar Traction  [x] Neuromuscular Re-education [x] Cold/hotpack [] Iontophoresis   [x] Instruction in HEP       [x] Manual Therapy     [x] vasopneumatic            [x] Self care home management        [x]Dry needling trigger point

## 2021-06-18 ENCOUNTER — HOSPITAL ENCOUNTER (OUTPATIENT)
Dept: PHYSICAL THERAPY | Age: 46
Setting detail: THERAPIES SERIES
Discharge: HOME | End: 2021-06-18
Payer: COMMERCIAL

## 2021-06-18 PROCEDURE — 97110 THERAPEUTIC EXERCISES: CPT

## 2021-06-18 PROCEDURE — 97035 APP MDLTY 1+ULTRASOUND EA 15: CPT

## 2021-06-18 PROCEDURE — 97016 VASOPNEUMATIC DEVICE THERAPY: CPT

## 2021-06-18 PROCEDURE — 97140 MANUAL THERAPY 1/> REGIONS: CPT

## 2021-06-18 NOTE — FLOWSHEET NOTE
Outpatient Physical Therapy  [facility]           [x] Phone: [phone]   Fax: [phone]  [facility]           [] Phone: [phone]   Fax: [phone]        Physical Therapy Daily Treatment Note  Date:  2021    Patient Name:  Hollis Acevedo    :  1975  MRN: [id number]  Restrictions/Precautions: Other position/activity restrictions: No formal restrictions  Diagnosis:   Diagnosis: Peroneal tendonitis LLE  Date of Injury/Surgery: 3/18/21  Treatment Diagnosis: Treatment Diagnosis: left ankle pain, swelling, impaired ROM/strength L ankle, antalgic gait    Insurance/Certification information: PT Insurance Information: [employer] [place] (NO REIMBURSEMENT FOR DNT)  Referring Physician:  Referring Practitioner: Krystian Paige PA-C  Next Doctor Visit:  Unknown  Plan of care signed (Y/N):  Pending  Outcome Measure: LEFS 26.25% disability  Visit# / total visits:     Pain level: 8-9/10    POC date range 21 - 21     Goals:          Long term goals  Time Frame for Long term goals : In 4 weeks, patient will  Long term goal 1: demonstrate compliance and independence w/HEP. Long term goal 2: score at <= 10% disability on LEFS as indication of functional improvement w/daily activities. Long term goal 3: ascend/descend (1) flight of stairs, using (1) HR light support, reciprocal pattern w/o pain or observable weakness. Long term goal 4: ambulate facility ad community surfaces x at least 1,000 feet w/normalized gait. Summary of Evaluation: Assessment: Patient reports being bit by a dog in March. Had swelling and pain, was on antibiotics and muscle relaxers. Since the injury, has developed foot pain which is not resolving. PLOF:  Ind. 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   Current LOF:  Still performing at independent level, all previously mentioned acitivities, w/pain left ankle, impaired walking quality and distance, has to take more sitting breaks, swelling off/on. Today, patient presents w/symptoms consistent w/peroneal tendonitis and will benefit from physical therapy. Subjective:  Pt reports she is not a good today - really hurting a lot!! Any changes in Ambulatory Summary Sheet? None        Objective:  See eval     Prior to today's treatment session, patient was screened for signs and symptoms related to COVID-19 including but not limited to verbally answering questions related to feeling ill, cough, or SOB, along with taking temperature via forehead thermometer. Patient presented with all negative signs and symptoms and had no fever >100 degrees Fahrenheit this date. Exercises: (No more than 4 columns)   Exercise/Equipment Date 6/09/21 #1 Date 6/18/21  #2 Date           WARM UP                     TABLE      Toe curls/towel gathering 3'     Isometric ankle  PF submax x 5  EV submax x 5  INV x 5  DF x 5     Manual therapy  See below    Desensitization  See below             Neptali Huynh  See below                    Other Therapeutic Activities/Education:    POC and treatment rationale/progression reviewed w/and accepted by patient. Discussed possible benefit of OTC foot orthotics d/t pes planus. Ice after exercise 10-15 min prn.     Home Exercise Program:    Toe curls  Ankle isometrics    Manual Treatments:    PROM/MOBS to the left foot/ankle joint complex,  Desensitization techniques - 5 objects 2x30\" rubbing each along lateral foot    Modalities:    US to peroneal tendon left lateral foot/ankle region 100% DC 1 MHz 8';  Game Ready to the left foot/ankle for 15' at low pressure with pt in long sitting position    Communication with other providers:    POC faxed to ordering provider 6/09/21    Assessment:  (Response towards treatment session) (Pain Rating)  Pt presents with

## 2021-06-22 ENCOUNTER — HOSPITAL ENCOUNTER (OUTPATIENT)
Dept: PHYSICAL THERAPY | Age: 46
Setting detail: THERAPIES SERIES
Discharge: HOME | End: 2021-06-22
Payer: COMMERCIAL

## 2021-06-22 PROCEDURE — 97110 THERAPEUTIC EXERCISES: CPT

## 2021-06-22 PROCEDURE — 97016 VASOPNEUMATIC DEVICE THERAPY: CPT

## 2021-06-22 PROCEDURE — 97140 MANUAL THERAPY 1/> REGIONS: CPT

## 2021-06-22 NOTE — FLOWSHEET NOTE
Outpatient Physical Therapy  [place]           [x] Phone: [phone]   Fax: [phone]  [place]           [] Phone: [phone]   Fax: [phone]        Physical Therapy Daily Treatment Note  Date:  2021    Patient Name:  Vanesa Olson    :  1975  MRN: [id number]  Restrictions/Precautions: Other position/activity restrictions: No formal restrictions  Diagnosis:   Diagnosis: Peroneal tendonitis LLE  Date of Injury/Surgery: 3/18/21  Treatment Diagnosis: Treatment Diagnosis: left ankle pain, swelling, impaired ROM/strength L ankle, antalgic gait    Insurance/Certification information: PT Insurance Information: Medical [place] (NO REIMBURSEMENT FOR DNT)  Referring Physician:  Referring Practitioner: Mary George PA-C  Next Doctor Visit:  Unknown  Plan of care signed (Y/N):  Pending  Outcome Measure: LEFS 26.25% disability  Visit# / total visits:      Pain level: 4-5/10    POC date range 21 - 21     Goals:          Long term goals  Time Frame for Long term goals : In 4 weeks, patient will  Long term goal 1: demonstrate compliance and independence w/HEP. Long term goal 2: score at <= 10% disability on LEFS as indication of functional improvement w/daily activities. Long term goal 3: ascend/descend (1) flight of stairs, using (1) HR light support, reciprocal pattern w/o pain or observable weakness. Long term goal 4: ambulate facility ad community surfaces x at least 1,000 feet w/normalized gait. Summary of Evaluation: Assessment: Patient reports being bit by a dog in March. Had swelling and pain, was on antibiotics and muscle relaxers. Since the injury, has developed foot pain which is not resolving. PLOF:  Ind. 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   Current LOF:  Still performing at independent level, all previously mentioned acitivities, w/pain left ankle, impaired walking quality and distance, has to take more sitting breaks, swelling off/on. Today, patient presents w/symptoms consistent w/peroneal tendonitis and will benefit from physical therapy. Subjective:  Pt arrives to tx session reporting 4-5/10 pain. Any changes in Ambulatory Summary Sheet? None        Objective:  See eval     COVID screening questions were asked and patient attested that there had been no contact or symptoms      Exercises: (No more than 4 columns)   Exercise/Equipment Date 6/09/21 #1 Date 6/18/21  #2 6/22/21 #3           WARM UP                     TABLE      Toe curls/towel gathering 3'  3'   Isometric ankle  PF submax x 5  EV submax x 5  INV x 5  DF x 5  PF submax x 5  EV submax x 5  INV x 5  DF x 5   Manual therapy  See below See below   Desensitization  See below See below             STANDING                                                     PROPRIOCEPTION                                    MODALITIES  See below See below                   Other Therapeutic Activities/Education:    POC and treatment rationale/progression reviewed w/and accepted by patient. Discussed possible benefit of OTC foot orthotics d/t pes planus. Ice after exercise 10-15 min prn. Home Exercise Program:    Toe curls  Ankle isometrics    Manual Treatments:    PROM/MOBS to the left foot/ankle joint complex; Desensitization techniques rubbing along lateral foot    Modalities:    Game [place] to the left foot/ankle for 15' at low pressure with pt in long sitting position    Communication with other providers:    POC faxed to ordering provider 6/09/21    Assessment:  (Response towards treatment session) (Pain Rating)  Pt tolerates session well today without adverse effects. Pt does tolerate manual interventions better today than the previous session - very sensitive; responds very well to desensitization techniques. Pt does have a decrease in complaints of pain/sensitivity.   End pain 3/10  Demonstrates good understanding of HEP. Assessment: Patient reports being bit by a dog in March. Had swelling and pain, was on antibiotics and muscle relaxers. Since the injury, has developed foot pain which is not resolving. PLOF:  Ind. 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 Current LOF:  Still performing at independent level, all previously mentioned acitivities, w/pain left ankle, impaired walking quality and distance, has to take more sitting breaks, swelling off/on. Today, patient presents w/symptoms consistent w/peroneal tendonitis and will benefit from physical therapy. Plan for Next Session:  Progress HEP as alexia, eventual advancement to PREs and wbing forces as alexia as pain indicates.    % left peroneal tendon       Time In / Time Out:     1345 / 1436    If BWC Please Indicate Time In/Out  CPT Code Time in Time out                                                              Timed Code/Total Treatment Minutes:  45' / 48'     1 TE    2 Man    1 Vaso      Next Progress Note due:  7/09/21      Plan of Care Interventions:  [x] Therapeutic Exercise  [] Modalities:  [x] Therapeutic Activity     [x] Ultrasound  [x] Estim  [x] Gait Training      [] Cervical Traction [] Lumbar Traction  [x] Neuromuscular Re-education    [x] Cold/hotpack [] Iontophoresis   [x] Instruction in HEP      [x] Vasopneumatic   [x] Dry Needling  (self-pay)  [x] Manual Therapy               [] Aquatic Therapy              Electronically signed by:  Jodee Brooks PTA     6/22/2021, 12:55 PM

## 2021-06-24 ENCOUNTER — HOSPITAL ENCOUNTER (OUTPATIENT)
Dept: PHYSICAL THERAPY | Age: 46
Setting detail: THERAPIES SERIES
Discharge: HOME | End: 2021-06-24
Payer: COMMERCIAL

## 2021-06-24 PROCEDURE — 97016 VASOPNEUMATIC DEVICE THERAPY: CPT

## 2021-06-24 PROCEDURE — 97110 THERAPEUTIC EXERCISES: CPT

## 2021-06-24 PROCEDURE — 97035 APP MDLTY 1+ULTRASOUND EA 15: CPT

## 2021-06-24 PROCEDURE — 97140 MANUAL THERAPY 1/> REGIONS: CPT

## 2021-06-24 NOTE — FLOWSHEET NOTE
Outpatient Physical Therapy  [place]           [x] Phone: [phone]   Fax: [phone]  [facility]           [] Phone: [phone]   Fax: [phone]        Physical Therapy Daily Treatment Note  Date:  2021    Patient Name:  Ant Harrison    :  1975  MRN: [id number]  Restrictions/Precautions: Other position/activity restrictions: No formal restrictions  Diagnosis:   Diagnosis: Peroneal tendonitis LLE  Date of Injury/Surgery: 3/18/21  Treatment Diagnosis: Treatment Diagnosis: left ankle pain, swelling, impaired ROM/strength L ankle, antalgic gait    Insurance/Certification information: PT Insurance Information: [employer] [place] (NO REIMBURSEMENT FOR DNT)  Referring Physician:  Referring Practitioner: Mynor Swann PA-C  Next Doctor Visit:  Unknown  Plan of care signed (Y/N):  Pending  Outcome Measure: LEFS 26.25% disability  Visit# / total visits:      Pain level: 4/10    POC date range 21 - 21     Goals:          Long term goals  Time Frame for Long term goals : In 4 weeks, patient will  Long term goal 1: demonstrate compliance and independence w/HEP. Long term goal 2: score at <= 10% disability on LEFS as indication of functional improvement w/daily activities. Long term goal 3: ascend/descend (1) flight of stairs, using (1) HR light support, reciprocal pattern w/o pain or observable weakness. Long term goal 4: ambulate facility ad community surfaces x at least 1,000 feet w/normalized gait. Summary of Evaluation: Assessment: Patient reports being bit by a dog in March. Had swelling and pain, was on antibiotics and muscle relaxers. Since the injury, has developed foot pain which is not resolving. PLOF:  Ind. 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   Current LOF:  Still performing at independent level, all previously mentioned acitivities, w/pain left ankle, impaired walking quality and distance, has to take more sitting breaks, swelling off/on. Today, patient presents w/symptoms consistent w/peroneal tendonitis and will benefit from physical therapy. Subjective:  Pt states she is doing better. Foot is definitely less sensitive. Any changes in Ambulatory Summary Sheet? None        Objective:       COVID screening questions were asked and patient attested that there had been no contact or symptoms  Pt is now able to tolerate min-mod pressure along lateral ankle and foot. Exercises: (No more than 4 columns)   Exercise/Equipment Date 6/09/21 #1 Date 6/18/21  #2 6/22/21 #3 6/24/21 #4            WARM UP                        TABLE       Toe curls/towel gathering 3'  3' 3'   Isometric ankle  PF submax x 5  EV submax x 5  INV x 5  DF x 5  PF submax x 5  EV submax x 5  INV x 5  DF x 5 PF submax 2x 5  EV submax 2x 5  INV 2x 5  DF 2x 5   Manual therapy  See below See below See below   Desensitization  See below See below        Ankle AROM    DF,PF,Inv, Evr, circles cw/ccw x 10 ea   Towel stretch    10 ct x 5   STANDING                                                             PROPRIOCEPTION                                          MODALITIES  See below See below See below                     Other Therapeutic Activities/Education:    POC and treatment rationale/progression reviewed w/and accepted by patient. Discussed possible benefit of OTC foot orthotics d/t pes planus. Ice after exercise 10-15 min prn.     Home Exercise Program:    Toe curls  Ankle isometrics  Added gentle HC stretch using towel/strap and ankle AROM DF/PF, inv/evr  Manual Treatments:    PROM to the left foot/ankle joint complex; gentle HC stretch    Modalities:    US to peroneal tendon left lateral foot/ankle region cont at 1.0w/cm2 x 8'  Vaso to the left foot/ankle for 10' at low pressure with pt in long sitting position    Communication with other providers:    POC faxed to ordering provider 6/09/21    Assessment:  (Response towards treatment session) (Pain Rating)  Pt demonstrated overall good tolerance to today's session without any adverse reaction. Tolerance to manual interventions improving. Continue with current POC. End pain 0/10 lateral foot   Demonstrates good understanding of HEP. Assessment: Patient reports being bit by a dog in March. Had swelling and pain, was on antibiotics and muscle relaxers. Since the injury, has developed foot pain which is not resolving. PLOF:  Ind. 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 Current LOF:  Still performing at independent level, all previously mentioned acitivities, w/pain left ankle, impaired walking quality and distance, has to take more sitting breaks, swelling off/on. Today, patient presents w/symptoms consistent w/peroneal tendonitis and will benefit from physical therapy. Plan for Next Session:  Progress HEP as alexia, eventual advancement to PREs and wbing forces as alexia as pain indicates.    % left peroneal tendon       Time In / Time Out:     1434 / 1530      Timed Code/Total Treatment Minutes:  55' / 64', (1) TE, (1) MT, (1) US, (1) vaso      Next Progress Note due:  7/09/21      Plan of Care Interventions:  [x] Therapeutic Exercise  [] Modalities:  [x] Therapeutic Activity     [x] Ultrasound  [x] Estim  [x] Gait Training      [] Cervical Traction [] Lumbar Traction  [x] Neuromuscular Re-education    [x] Cold/hotpack [] Iontophoresis   [x] Instruction in HEP      [x] Vasopneumatic   [x] Dry Needling  (self-pay)  [x] Manual Therapy               [] Aquatic Therapy              Electronically signed by:  Sekou Dugan PTA    6/24/2021, 2:34 PM

## 2021-06-28 ENCOUNTER — HOSPITAL ENCOUNTER (OUTPATIENT)
Dept: PHYSICAL THERAPY | Age: 46
Discharge: HOME | End: 2021-06-28

## 2021-06-28 NOTE — FLOWSHEET NOTE
Physical Therapy  Cancellation/No-show Note  Patient Name:  Micha Henry  :  1975   Date:  2021  Cancelled visits to date: 1  No-shows to date: 0    For today's appointment patient:  []  Cancelled  []  Rescheduled appointment  []  No-show     Reason given by patient:  []  Patient ill  []  Conflicting appointment  []  No transportation    [x]  Conflict with work  []  No reason given  []  Other:     Comments:      Electronically signed by:  Edil Garrett PT, 2021, 7:29 PM

## 2021-07-06 ENCOUNTER — HOSPITAL ENCOUNTER (OUTPATIENT)
Dept: PHYSICAL THERAPY | Age: 46
Setting detail: THERAPIES SERIES
Discharge: HOME | End: 2021-07-06
Payer: COMMERCIAL

## 2021-07-06 PROCEDURE — 97110 THERAPEUTIC EXERCISES: CPT

## 2021-07-06 PROCEDURE — 97112 NEUROMUSCULAR REEDUCATION: CPT

## 2021-07-06 NOTE — DISCHARGE SUMMARY
Outpatient Physical Therapy        [] Phone: [phone]   Fax: [phone]  Physician:  NASIR Fajardo PA-C        From: Harry Sparks PT    Patient: Kalyan Gaspar                    : 1975  Diagnosis:   Diagnosis: Peroneal tendinitis LLE  Date of Injury/Surgery: 3/18/21  Treatment Diagnosis: Treatment Diagnosis: left ankle pain, swelling, impaired ROM/strength L ankle, antalgic gait       Date: 2021    []  Progress Note                [x]  Discharge Note    Total Visits to date:    5   Cancels/No-shows to date:   1 CX    Subjective:    Performing HEP. Walking w/o limp at work and in community. Using stepper at her gym. Pt states she is doing better. Foot w/no sensitivity. Feels prepared for discharge. Prominent Objective Findings:    LEFS:  73/80 or 8.75% disability (was 26.25% at eval)     Gait is non-antalgic/normalized  Ascended/descended 11 stairs Mod Ind., using 1HR light support, reciprocal non-painful pattern, no obvious weakness     No palpable tenderness left ankle  No obvious edema      DF 12 deg no pain (was 6 at eval)  PF 60 deg no pain  (was 60 at eval)     MMT  DF 4+/5 (Was 4/5 at eval)  EV 4/5 (was 2+/5 at eval)  INV 5/5 (was 3-/5 at eval)  PF 5/5 (was 3-/5 at eval)    Assessment:    Pt has been seen x 5 physical therapy sessions from 21 - 21. Strength and ROM improvement is significant. No longer sensitive to touch L lateral foot/ankle. Gait is normalized. All goals met. Discharge to HEP.       Goal Status:  [x] Achieved [] Partially Achieved  [] Not Achieved     Long term goals  Time Frame for Long term goals : In 4 weeks, patient will  Long term goal 1: demonstrate compliance and independence w/HEP. - MET 21  Long term goal 2: score at <= 10% disability on LEFS as indication of functional improvement w/daily activities.  - MET 21  Long term goal 3: ascend/descend (1) flight of stairs, using (1) HR light support, reciprocal pattern w/o pain or observable weakness. - MET 7/06/21  Long term goal 4: ambulate facility ad community surfaces x at least 1,000 feet w/normalized gait. - MET 7/06/21     Changes to goals:NO    Services:  [x] Therapeutic Exercise    [x] Modalities:  [x] Therapeutic Activity     [x] Ultrasound  [] Electric Stimulation  [x] Gait Training      [] Cervical Traction    [] Lumbar Traction  [x] Neuromuscular Re-education  [] Cold/hotpack [] Iontophoresis  [x] Instruction in HEP      Other:  [x] Manual Therapy       [x]  Vasopneumatic  [x] Self care management                           [] Dry needling trigger point/pain management                ? Patient Status: [] Continue per initial Plan of Care     [x] Patient now discharged     [] Additional visits requested, Please re-certify for additional visits: If we are requesting more visits, we fully anticipate the patient's condition is expected to improve within the treatment timeframe we are requesting. Electronically signed by:  Edil Garrett PT, PT, 7/6/2021, 2:46 PM    If you have any questions or concerns, please don't hesitate to call.   Thank you for your referral.

## 2021-07-06 NOTE — FLOWSHEET NOTE
Outpatient Physical Therapy  [place]           [x] Phone: [phone]   Fax: [phone]  [place]           [] Phone: [phone]   Fax: [phone]        Physical Therapy Daily Treatment Note  DISCHARGE  Date:  2021    Patient Name:  Michelle Trejo    :  1975  MRN: [id number]  Restrictions/Precautions: Other position/activity restrictions: No formal restrictions  Diagnosis:   Diagnosis: Peroneal tendonitis LLE  Date of Injury/Surgery: 3/18/21  Treatment Diagnosis: Treatment Diagnosis: left ankle pain, swelling, impaired ROM/strength L ankle, antalgic gait    Insurance/Certification information: PT Insurance Information: Medical [place] (NO REIMBURSEMENT FOR DNT)  Referring Physician:  Referring Practitioner: Jeanne Gutiérrez PA-C  Next Doctor Visit:  Unknown  Plan of care signed (Y/N):  Yes  Outcome Measure: LEFS 26.25% disability  21:  LEFS 73/80 (8.75% disability)    Visit# / total visits:   6   Pain level: 1/10    POC date range 21 - 21     Goals:          Long term goals  Time Frame for Long term goals : In 4 weeks, patient will  Long term goal 1: demonstrate compliance and independence w/HEP. - MET 21  Long term goal 2: score at <= 10% disability on LEFS as indication of functional improvement w/daily activities. - MET 21  Long term goal 3: ascend/descend (1) flight of stairs, using (1) HR light support, reciprocal pattern w/o pain or observable weakness. - MET 21  Long term goal 4: ambulate facility ad community surfaces x at least 1,000 feet w/normalized gait. - MET 21    Summary of Evaluation: Assessment: Patient reports being bit by a dog in March. Had swelling and pain, was on antibiotics and muscle relaxers. Since the injury, has developed foot pain which is not resolving. PLOF:  Ind. 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   Current LOF:  Still performing at independent level, all previously mentioned acitivities, w/pain left ankle, impaired walking quality and distance, has to take more sitting breaks, swelling off/on. Today, patient presents w/symptoms consistent w/peroneal tendonitis and will benefit from physical therapy. Subjective:    Performing HEP. Walking w/o limp at work and in community. Using stepper at her gym. Pt states she is doing better. Foot is definitely less sensitive. Feels prepared for discharge. Any changes in Ambulatory Summary Sheet? None        Objective:       COVID screening questions were asked and patient attested that there had been no contact or symptoms  Pt is now able to tolerate min-mod pressure along lateral ankle and foot.     AROM      LEFS:  73/80 or 8.75% disability (was 26.25% at eval)    Gait is non-antalgic/normalized  Ascended/descended 11 stairs Mod Ind., using 1HR light support, reciprocal non-painful pattern, no obvious weakness    No palpable tenderness left ankle  No obvious edema     DF 12 deg no pain (was 6 at eval)  PF 60 deg no pain  (was 60 at eval)    MMT  DF 4+/5 (Was 4/5 at eval)  EV 4/5 (was 2+/5 at eval)  INV 5/5 (was 3-/5 at eval)  PF 5/5 (was 3-/5 at eval)        Exercises: (No more than 4 columns)   Exercise/Equipment 6/22/21 #3 6/24/21 #4 7/06/21 #5        DISCHARGE   WARM UP                     TABLE      Toe curls/towel gathering 3' 3'    Isometric ankle  PF submax x 5  EV submax x 5  INV x 5  DF x 5 PF submax 2x 5  EV submax 2x 5  INV 2x 5  DF 2x 5    Manual therapy See below See below    Desensitization See below         Ankle AROM  DF,PF,Inv, Evr, circles cw/ccw x 10 ea    Towel stretch  10 ct x 5    Ankle EV   RTB  2 x 10               STANDING      Lunge   BLE lunges  No UE support  1 x 10 ea   No pain, mild tightness posterior ankle w/RLE lunge   Heel raises   X 20  No UE support                                      PROPRIOCEPTION      2\" foam   LLE SLS  2 x 10 sec    LLE  SLS w/reach for cone, no UE use  1 x 5    Cone tap w/R foot  SLS LLE  1 x 10                           MODALITIES See below See below NO                   Other Therapeutic Activities/Education:    POC and treatment rationale/progression reviewed w/and accepted by patient. Discussed possible benefit of OTC foot orthotics d/t pes planus. Ice after exercise 10-15 min prn. Home Exercise Program:    Toe curls  Ankle isometrics  Added gentle HC stretch using towel/strap and ankle AROM DF/PF, inv/evr  Added:  EV w/RTB  Lunges  Standing balance (SLS)    Manual Treatments:    NO    Modalities:    NO    Communication with other providers:    POC faxed to ordering provider 6/09/21    Assessment:  (Response towards treatment session) (Pain Rating)  Pt has been seen x 5 physical therapy sessions from 6/09/21 - 7/06/21. Strength and ROM improvement is significant. No longer sensitive to touch L lateral foot/ankle. Gait is normalized. All goals met or nearly met. Discharge to [facility]. Assessment: Patient reports being bit by a dog in March. Had swelling and pain, was on antibiotics and muscle relaxers. Since the injury, has developed foot pain which is not resolving. PLOF:  Ind. 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 Current LOF:  Still performing at independent level, all previously mentioned acitivities, w/pain left ankle, impaired walking quality and distance, has to take more sitting breaks, swelling off/on. Today, patient presents w/symptoms consistent w/peroneal tendonitis and will benefit from physical therapy.       Plan for Next Session:   NA    Time In / Time Out:     1345/1425      Timed Code/Total Treatment Minutes:  40'/40'  TE 25' (2), NRE 15' (1)      Next Progress Note due:  NA      Plan of Care Interventions:  [x] Therapeutic Exercise  [] Modalities:  [x] Therapeutic Activity     [x] Ultrasound  [x] Estim  [x] Gait Training      [] Cervical Traction [] Lumbar Traction  [x] Neuromuscular Re-education    [x] Cold/hotpack [] Iontophoresis   [x] Instruction in HEP      [x] Vasopneumatic   [x] Dry Needling  (self-pay)  [x] Manual Therapy               [] Aquatic Therapy              Electronically signed by:  Eduardo Nj, PT  7/6/2021, 1:49 PM

## 2021-09-02 ENCOUNTER — HOSPITAL ENCOUNTER (OUTPATIENT)
Age: 46
Setting detail: SPECIMEN
Discharge: HOME | End: 2021-09-02
Payer: COMMERCIAL

## 2021-09-02 ENCOUNTER — OFFICE VISIT (OUTPATIENT)
Dept: FAMILY MEDICINE CLINIC | Age: 46
End: 2021-09-02
Payer: COMMERCIAL

## 2021-09-02 VITALS — OXYGEN SATURATION: 98 % | HEART RATE: 95 BPM | RESPIRATION RATE: 16 BRPM

## 2021-09-02 DIAGNOSIS — J45.909 UNCOMPLICATED ASTHMA, UNSPECIFIED ASTHMA SEVERITY, UNSPECIFIED WHETHER PERSISTENT: ICD-10-CM

## 2021-09-02 DIAGNOSIS — R05.9 COUGH: Primary | ICD-10-CM

## 2021-09-02 LAB
SARS-COV-2: DETECTED
SOURCE: ABNORMAL

## 2021-09-02 PROCEDURE — G8428 CUR MEDS NOT DOCUMENT: HCPCS | Performed by: PHYSICIAN ASSISTANT

## 2021-09-02 PROCEDURE — U0005 INFEC AGEN DETEC AMPLI PROBE: HCPCS

## 2021-09-02 PROCEDURE — G8417 CALC BMI ABV UP PARAM F/U: HCPCS | Performed by: PHYSICIAN ASSISTANT

## 2021-09-02 PROCEDURE — 1036F TOBACCO NON-USER: CPT | Performed by: PHYSICIAN ASSISTANT

## 2021-09-02 PROCEDURE — U0003 INFECTIOUS AGENT DETECTION BY NUCLEIC ACID (DNA OR RNA); SEVERE ACUTE RESPIRATORY SYNDROME CORONAVIRUS 2 (SARS-COV-2) (CORONAVIRUS DISEASE [COVID-19]), AMPLIFIED PROBE TECHNIQUE, MAKING USE OF HIGH THROUGHPUT TECHNOLOGIES AS DESCRIBED BY CMS-2020-01-R: HCPCS

## 2021-09-02 PROCEDURE — 99213 OFFICE O/P EST LOW 20 MIN: CPT | Performed by: PHYSICIAN ASSISTANT

## 2021-09-02 RX ORDER — BENZONATATE 100 MG/1
100-200 CAPSULE ORAL NIGHTLY PRN
Qty: 30 CAPSULE | Refills: 0 | Status: SHIPPED | OUTPATIENT
Start: 2021-09-02 | End: 2022-06-23

## 2021-09-02 RX ORDER — ALBUTEROL SULFATE 90 UG/1
2 AEROSOL, METERED RESPIRATORY (INHALATION) 4 TIMES DAILY PRN
Qty: 54 G | Refills: 1 | Status: SHIPPED | OUTPATIENT
Start: 2021-09-02 | End: 2022-06-23 | Stop reason: SDUPTHER

## 2021-09-02 NOTE — PROGRESS NOTES
Castillo Yoon (:  1975) is a 50 y.o. female,Established patient, here for evaluation of the following chief complaint(s):    No chief complaint on file. SUBJECTIVE/OBJECTIVE:  HPI  Chief complaint and history of present illness as per medical assistant/nurse documented today in the Flu/COVID-19 clinic. Patient is here with complaints of body aches, sore throat, headache, chest congestion worsening over the past 2 days. Patient reports possible remote history of asthma but does not have a rescue inhaler at home; denies asthma flareups in the past several years. Current Outpatient Medications   Medication Sig Dispense Refill    albuterol sulfate HFA (VENTOLIN HFA) 108 (90 Base) MCG/ACT inhaler Inhale 2 puffs into the lungs 4 times daily as needed for Wheezing Or shortness of breath etc 54 g 1    benzonatate (TESSALON) 100 MG capsule Take 1-2 capsules by mouth nightly as needed for Cough 30 capsule 0    ibuprofen (ADVIL;MOTRIN) 200 MG tablet Take 200 mg by mouth every 6 hours as needed for Pain       No current facility-administered medications for this visit. Review of Systems    Physical Exam  Vitals and nursing note reviewed. Constitutional:       General: She is not in acute distress. Appearance: She is not ill-appearing. HENT:      Head: Normocephalic and atraumatic. Right Ear: Tympanic membrane and external ear normal.      Left Ear: Tympanic membrane and external ear normal.      Nose: No congestion or rhinorrhea. Mouth/Throat:      Mouth: Mucous membranes are moist.      Pharynx: No oropharyngeal exudate or posterior oropharyngeal erythema. Neck:      Vascular: No carotid bruit. Cardiovascular:      Rate and Rhythm: Normal rate. Pulses: Normal pulses. Pulmonary:      Effort: Pulmonary effort is normal.   Abdominal:      Palpations: Abdomen is soft. Musculoskeletal:         General: Normal range of motion. Cervical back: Normal range of motion. No rigidity. Skin:     General: Skin is warm and dry. Capillary Refill: Capillary refill takes less than 2 seconds. Neurological:      Mental Status: She is oriented to person, place, and time. Mental status is at baseline. Psychiatric:         Mood and Affect: Mood normal.         ASSESSMENT/PLAN:  1. Cough   -Will obtain Covid test; stress supportive care strategies with patient. Continue to monitor for symptoms and report to emergency department if symptoms worsen.   -Advised patient on proper use of Tessalon Perles; only use for significant coughing episodes; otherwise clear secretions. -     Covid-19 Ambulatory  -     benzonatate (TESSALON) 100 MG capsule; Take 1-2 capsules by mouth nightly as needed for Cough, Disp-30 capsule, R-0Normal  2. Uncomplicated asthma, unspecified asthma severity, unspecified whether persistent   Lung sounds are clear in clinic today; no wheezing present; however patient does report history of asthma; will send in albuterol rescue inhaler for patient to use if needed. -     albuterol sulfate HFA (VENTOLIN HFA) 108 (90 Base) MCG/ACT inhaler; Inhale 2 puffs into the lungs 4 times daily as needed for Wheezing Or shortness of breath etc, Disp-54 g, R-1Normal    Increase fluids and rest  Saline nasal spray as needed for nasal congestion  Warm salt gargles as needed for throat discomfort  Monitor temperature twice a day  Tylenol as needed for fevers and/or discomfort. Big deep breaths periodically throughout the day  Regular Mucinex over the counter as needed for chest congestion  If symptoms worsen -Go to the ER. Return if symptoms worsen or fail to improve, for Follow Up. An electronic signature was used to authenticate this note.     --FAUSTINO Pabon

## 2021-09-02 NOTE — PROGRESS NOTES
9/2/21  Chetan Peterson  1975    FLU/COVID-19 CLINIC EVALUATION    HPI SYMPTOMS:    Employer:    [] Fevers  [] Chills  [x] Cough  [x] Coughing up blood  [x] Chest Congestion  [] Nasal Congestion  [] Feeling short of breath  [] Sometimes  [] Frequently  [] All the time  [x] Chest pain  [x] Headaches  [x]Tolerable  [] Severe  [] Sore throat  [x] Muscle aches  [] Nausea  [] Vomiting  []Unable to keep fluids down  [] Diarrhea  []Severe    [x] OTHER SYMPTOMS:      Symptom Duration:   [] 1  [x] 2   [] 3   [] 4    [] 5   [] 6   [] 7   [] 8   [] 9   [] 10   [] 11   [] 12   [] 13   [] 14   [] Longer than 14 days    Symptom course:   [x] Worsening     [] Stable     [] Improving    RISK FACTORS:    [] Pregnant or possibly pregnant  [] Age over 61  [] Diabetes  [] Heart disease  [x] Asthma  [] COPD/Other chronic lung diseases  [] Active Cancer  [] On Chemotherapy  [] Taking oral steroids  [] History Lymphoma/Leukemia  [x] Close contact with a lab confirmed COVID-19 patient within 14 days of symptom onset  [] History of travel from affected geographical areas within 14 days of symptom onset       VITALS:  There were no vitals filed for this visit. TESTS:    POCT FLU:  [] Positive     []Negative    ASSESSMENT:    [] Flu  [] Possible COVID-19  [] Strep    PLAN:    [] Discharge home with written instructions for:  [] Flu management  [] Possible COVID-19 infection with self-quarantine and management of symptoms  [] Follow-up with primary care physician or emergency department if worsens  [] Evaluation per physician/NP/PA in clinic  [] Sent to ER       An  electronic signature was used to authenticate this note.      --Aj Torres on 9/2/2021 at 9:16 AM

## 2021-09-02 NOTE — PATIENT INSTRUCTIONS
Your COVID 19 test can take 1-5 days for the results to come back. We ask that you make a Mychart page and view your test results this way. You will need to Self quarantine until you know your results. Increase fluids and rest  Saline nasal spray as needed for nasal congestion  Warm salt gargles as needed for throat discomfort  Monitor temperature twice a day  Tylenol as needed for fevers and/or discomfort. Big deep breaths periodically throughout the day  Regular Mucinex over the counter as needed for chest congestion  If symptoms worsen -Go to the ER. Follow up with your primary care provider      To Whom it May Concern:    Cristo Mclaughlin was tested for COVID-19 9/2/2021. He/she must stay home until test results are back. If test is positive, he/she must quarantine for a total of 10 days starting from day one of symptom onset. He/she must also be fever-free for 24 hours at that time, and also have improvement in symptoms. We do not recommend retesting as patients may continue to test positive for months even though no longer contagious. It is suggested you call [address] or  [facility] with any questions regarding quarantine timeframe/return to work/school details.

## 2021-09-03 NOTE — RESULT ENCOUNTER NOTE
Darius Cheema . Manju,    Your Covid test result came back positive; be sure to continue to monitor for symptoms and stress supportive care strategies. You can use the inhaler which was sent in for you if you do feel short of breath or having significant coughing episodes etc.  Report to the emergency department if you begin to experience significant shortness of breath or other concerning symptoms. Please let me know if you have any other questions or concerns.   Thanks,  Zeina Lima

## 2021-09-09 ENCOUNTER — PATIENT MESSAGE (OUTPATIENT)
Dept: FAMILY MEDICINE CLINIC | Age: 46
End: 2021-09-09

## 2021-09-09 NOTE — TELEPHONE ENCOUNTER
From: Bang Rico  To: Illa Boas, APRN - NP  Sent: 9/9/2021 2:17 PM EDT  Subject: Non-Urgent Medical Question    Hello I tested positive for COVID last Thursday. I have been quarantined since then. My symptoms are coughing, headaches, diarrhea, nausea, all over aches, sore throat,loss of taste and smell and today I have been vomiting. I was given an inhaler for breathing issues which I've used a few times. I had a fever a few days but haven't since the first week. My concern is not getting any better. Some days I feel better when I first get up but by the middle of the day I'm exhausted. The vomiting that started today is also a concern. I have been taking zinc and vitamin D that is it. Is there something else I should be doing?

## 2021-09-10 RX ORDER — ONDANSETRON 4 MG/1
4 TABLET, FILM COATED ORAL DAILY PRN
Qty: 30 TABLET | Refills: 0 | Status: SHIPPED | OUTPATIENT
Start: 2021-09-10 | End: 2022-06-23

## 2021-09-10 NOTE — TELEPHONE ENCOUNTER
OTC symptom management, rest, hydrating with electrolytes and water, small meals, bland meals--   I can send some zofran for the nausea and emesis and tessalon pearle for the cough. Unfortunately, [employer]  is viral and has to be treated symptomatically as above and with time - symptoms should improve. We can send her for the infusion, but there Is limited availability. An in office provider would have to complete the form as this provider is not in office.

## 2021-09-14 NOTE — TELEPHONE ENCOUNTER
Tried to get patient an appointment but is not able to do a vv until 9/22/21, pt stated that she is having an issue with severe sinus pressure in the T Zone and would like to know if she can get something to help with this

## 2021-09-14 NOTE — TELEPHONE ENCOUNTER
Can schedule her at the end of the day tomorrow virtual. 4pm double book. COVID is viral - for sinus symptoms and congestion, she should take sudafed and or  mucinex. (if cardiac disease or HTN, take coricidin). Warm mist humidifier, hot steam showers, increase fluids, sinus rinse.  Can discuss further tomorrow

## 2021-09-15 ENCOUNTER — TELEMEDICINE (OUTPATIENT)
Dept: FAMILY MEDICINE CLINIC | Age: 46
End: 2021-09-15
Payer: COMMERCIAL

## 2021-09-15 DIAGNOSIS — U07.1 COVID-19: Primary | ICD-10-CM

## 2021-09-15 PROCEDURE — G8427 DOCREV CUR MEDS BY ELIG CLIN: HCPCS | Performed by: NURSE PRACTITIONER

## 2021-09-15 PROCEDURE — 99213 OFFICE O/P EST LOW 20 MIN: CPT | Performed by: NURSE PRACTITIONER

## 2021-09-15 NOTE — PROGRESS NOTES
9/15/2021    TELEHEALTH EVALUATION -- Audio/Visual (During [facility]XS[facility]-71 public health emergency)    HPI:    Valdo Levin (:  1975) has requested an audio/video evaluation for the following concern(s):      COVID positive 21    Cough, headache, fatigue, body aches. \"I feel a little better than yesterday. Had a lot of nasal drainage and sinus pressure, but it has improved today\"  The headache and pressure across her eyes is still there. Has been taking dayquil cold and flu and tylenol. 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 Not wanting any further days off work. Denies need for work excuse. No fever sweating chills. Review of Systems    Prior to Visit Medications    Medication Sig Taking? Authorizing Provider   ondansetron (ZOFRAN) 4 MG tablet Take 1 tablet by mouth daily as needed for Nausea or Vomiting  Anderson All, APRN - NP   albuterol sulfate HFA (VENTOLIN HFA) 108 (90 Base) MCG/ACT inhaler Inhale 2 puffs into the lungs 4 times daily as needed for Wheezing Or shortness of breath etc  FAUSTINO Suarez   benzonatate (TESSALON) 100 MG capsule Take 1-2 capsules by mouth nightly as needed for Cough  FAUSTINO Suarez   ibuprofen (ADVIL;MOTRIN) 200 MG tablet Take 200 mg by mouth every 6 hours as needed for Pain  Historical Provider, MD       Social History     Tobacco Use    Smoking status: Never Smoker    Smokeless tobacco: Never Used   Substance Use Topics    Alcohol use: Yes     Alcohol/week: 2.0 standard drinks     Types: 2 Cans of beer per week     Comment: average- one or two times per month\"    Drug use: No        PHYSICAL EXAMINATION:  Vital Signs: (As obtained by patient/caregiver or practitioner observation)    Blood pressure-  Heart rate-    Respiratory rate-    Temperature-  Pulse oximetry-     Physical Exam  Vitals reviewed. Constitutional:       General: She is not in acute distress. Appearance: Normal appearance. She is normal weight. She is not ill-appearing, toxic-appearing or diaphoretic. HENT:      Head: Normocephalic and atraumatic. Nose: Nose normal.   Eyes:      Extraocular Movements: Extraocular movements intact. Pupils: Pupils are equal, round, and reactive to light. Pulmonary:      Effort: Pulmonary effort is normal. No respiratory distress. Musculoskeletal:         General: Normal range of motion. Cervical back: Normal range of motion. Skin:     Coloration: Skin is not pale. Neurological:      General: No focal deficit present. Mental Status: She is alert and oriented to person, place, and time. Mental status is at baseline. Psychiatric:         Mood and Affect: Mood normal.         Behavior: Behavior normal.         Thought Content: Thought content normal.         Judgment: Judgment normal.         ASSESSMENT/PLAN:  1. COVID-19  Start mucinex and sudafed. Need to decongest sinus's   Tylenol PRN. Rest. Fluids. Call if not better by Monday - will send ATB and medrol at that point for sinus infection       All care gaps addressed     All questions answered    Discussed use, benefit, and side effects of prescribed medications. Barriers to compliance discussed. All patient questions answered. Pt voiced understanding. Present to the ER for any emergent or acute symptoms not managed at home or in office. Please note that this chart was generated using dragon dictation software. Although every effort was made to ensure the accuracy of this automated transcription, some errors in transcription may have occurred. No follow-ups on file. Carmelina Lima is a 50 y.o. female being evaluated by a Virtual Visit (video visit) encounter to address concerns as mentioned above. A caregiver was present when appropriate.  Due to this being a TeleHealth encounter ([facility]), evaluation of the following organ systems was limited: Vitals/Constitutional/EENT/Resp/CV/GI//MS/Neuro/Skin/Heme-Lymph-Imm. Pursuant to the emergency declaration under the [address] [place] [address], [address] [place] [address] and the [employer] Act, this Virtual Visit was conducted with patient's (and/or legal guardian's) consent, to reduce the patient's risk of exposure to COVID-19 and provide necessary medical care. The patient (and/or legal guardian) has also been advised to contact this office for worsening conditions or problems, and seek emergency medical treatment and/or call 911 if deemed necessary. Patient identification was verified at the start of the visit: Yes    Total time spent on this encounter: 15 min    Services were provided through a video synchronous discussion virtually to substitute for in-person clinic visit. Patient and provider were located at their individual homes. --KUSHAL Oviedo NP on 9/15/2021 at 4:00 PM    An electronic signature was used to authenticate this note.

## 2021-11-22 ENCOUNTER — NURSE TRIAGE (OUTPATIENT)
Dept: OTHER | Facility: CLINIC | Age: 46
End: 2021-11-22

## 2021-11-22 ENCOUNTER — TELEPHONE (OUTPATIENT)
Dept: FAMILY MEDICINE CLINIC | Age: 46
End: 2021-11-22

## 2021-11-22 NOTE — TELEPHONE ENCOUNTER
Received call from Lziette Murguia at [facility] with [email or web address]. Brief description of triage: Pt calls to report symptoms of back pain. States symptoms originally started when she had covid in beginning of September. Rates pain as moderate. Reports pain radiates into her chest. States she is having intermittent toe numbness. Denies injury, dysuria, or abdominal pain. Triage indicates for patient to: See PCP within 24 hours. Care advice provided, patient verbalizes understanding; denies any other questions or concerns; instructed to call back for any new or worsening symptoms. Writer provided warm transfer to hermes at [facility] for appointment scheduling. Attention Provider: Thank you for allowing me to participate in the care of your patient. The patient was connected to triage in response to information provided to the [facility]/PSC. Please do not respond through this encounter as the response is not directed to a shared pool. Reason for Disposition   Numbness in a leg or foot (i.e., loss of sensation)    Answer Assessment - Initial Assessment Questions  1. ONSET: \"When did the pain begin? \"       Beginning of September, but progressively gotten worse over the past week    2. LOCATION: \"Where does it hurt? \" (upper, mid or lower back)      All over but worst mid/upper    3. SEVERITY: \"How bad is the pain? \"  (e.g., Scale 1-10; mild, moderate, or severe)    - MILD (1-3): doesn't interfere with normal activities     - MODERATE (4-7): interferes with normal activities or awakens from sleep     - SEVERE (8-10): excruciating pain, unable to do any normal activities       Moderate    4. PATTERN: \"Is the pain constant? \" (e.g., yes, no; constant, intermittent)       Constant tenderness but intermittent sharp    5. RADIATION: \"Does the pain shoot into your legs or elsewhere? \"      Chest    6. CAUSE:  \"What do you think is causing the back pain? \"       Unknown    7.  BACK OVERUSE:  Chitra Sellers recent lifting of heavy objects, strenuous work or exercise? \"      No    8. MEDICATIONS: \"What have you taken so far for the pain? \" (e.g., nothing, acetaminophen, NSAIDS)     OTC pain relievers     9. NEUROLOGIC SYMPTOMS: \"Do you have any weakness, numbness, or problems with bowel/bladder control? \"      Numbness in toes intermittently     10. OTHER SYMPTOMS: \"Do you have any other symptoms? \" (e.g., fever, abdominal pain, burning with urination, blood in urine)        Ear pain,     11. PREGNANCY: \"Is there any chance you are pregnant? \" (e.g., yes, no; LMP)        No    Protocols used: BACK PAIN-ADULT-AH

## 2021-11-22 NOTE — TELEPHONE ENCOUNTER
----- Message from Dany Kendra sent at 11/22/2021  4:24 PM EST -----  Subject: Appointment Request    Reason for Call: Urgent Return from RN Triage    QUESTIONS  Type of Appointment? Established Patient  Reason for appointment request? Available appointments did not meet   patient need  Additional Information for Provider? Patient returned from [facility] Aleksandra Galdamez)   wanted to be seen 24 hours. Getting only VV for patient and call   forwarding when calling office. Please all her to appoint. She may go to   walk in clinic in [facility].  ---------------------------------------------------------------------------  --------------  [address]  What is the best way for the office to contact you? OK to leave message on   voicemail  Preferred Call Back Phone Number?  [phone]  ---------------------------------------------------------------------------  --------------  SCRIPT ANSWERS

## 2021-11-23 ENCOUNTER — APPOINTMENT (OUTPATIENT)
Dept: GENERAL RADIOLOGY | Age: 46
End: 2021-11-23
Payer: COMMERCIAL

## 2021-11-23 ENCOUNTER — HOSPITAL ENCOUNTER (EMERGENCY)
Age: 46
Discharge: HOME | End: 2021-11-23
Attending: EMERGENCY MEDICINE
Payer: COMMERCIAL

## 2021-11-23 VITALS
OXYGEN SATURATION: 99 % | HEIGHT: 66 IN | SYSTOLIC BLOOD PRESSURE: 136 MMHG | TEMPERATURE: 98.1 F | WEIGHT: 190 LBS | RESPIRATION RATE: 16 BRPM | DIASTOLIC BLOOD PRESSURE: 86 MMHG | HEART RATE: 88 BPM | BODY MASS INDEX: 30.53 KG/M2

## 2021-11-23 DIAGNOSIS — R06.02 SHORTNESS OF BREATH: ICD-10-CM

## 2021-11-23 DIAGNOSIS — M54.6 ACUTE BILATERAL THORACIC BACK PAIN: Primary | ICD-10-CM

## 2021-11-23 DIAGNOSIS — R07.9 CHEST PAIN, UNSPECIFIED TYPE: ICD-10-CM

## 2021-11-23 LAB
ALBUMIN SERPL-MCNC: 4.5 GM/DL (ref 3.4–5)
ALP BLD-CCNC: 51 IU/L (ref 40–129)
ALT SERPL-CCNC: 19 U/L (ref 10–40)
ANION GAP SERPL CALCULATED.3IONS-SCNC: 7 MMOL/L (ref 4–16)
AST SERPL-CCNC: 12 IU/L (ref 15–37)
BASOPHILS ABSOLUTE: 0 K/CU MM
BASOPHILS RELATIVE PERCENT: 0.2 % (ref 0–1)
BILIRUB SERPL-MCNC: 0.3 MG/DL (ref 0–1)
BUN BLDV-MCNC: 10 MG/DL (ref 6–23)
CALCIUM SERPL-MCNC: 10 MG/DL (ref 8.3–10.6)
CHLORIDE BLD-SCNC: 101 MMOL/L (ref 99–110)
CO2: 29 MMOL/L (ref 21–32)
CREAT SERPL-MCNC: 0.5 MG/DL (ref 0.6–1.1)
D DIMER: <0.47 UG/ML (FEU)
DIFFERENTIAL TYPE: ABNORMAL
EKG ATRIAL RATE: 80 BPM
EKG DIAGNOSIS: NORMAL
EKG P AXIS: 33 DEGREES
EKG P-R INTERVAL: 176 MS
EKG Q-T INTERVAL: 376 MS
EKG QRS DURATION: 72 MS
EKG QTC CALCULATION (BAZETT): 433 MS
EKG R AXIS: 17 DEGREES
EKG T AXIS: 10 DEGREES
EKG VENTRICULAR RATE: 80 BPM
EOSINOPHILS ABSOLUTE: 0.1 K/CU MM
EOSINOPHILS RELATIVE PERCENT: 1.2 % (ref 0–3)
GFR AFRICAN AMERICAN: >60 ML/MIN/1.73M2
GFR NON-AFRICAN AMERICAN: >60 ML/MIN/1.73M2
GLUCOSE BLD-MCNC: 92 MG/DL (ref 70–99)
HCT VFR BLD CALC: 40.7 % (ref 37–47)
HEMOGLOBIN: 13.5 GM/DL (ref 12.5–16)
IMMATURE NEUTROPHIL %: 0.3 % (ref 0–0.43)
LYMPHOCYTES ABSOLUTE: 2.3 K/CU MM
LYMPHOCYTES RELATIVE PERCENT: 39.4 % (ref 24–44)
MCH RBC QN AUTO: 30 PG (ref 27–31)
MCHC RBC AUTO-ENTMCNC: 33.2 % (ref 32–36)
MCV RBC AUTO: 90.4 FL (ref 78–100)
MONOCYTES ABSOLUTE: 0.4 K/CU MM
MONOCYTES RELATIVE PERCENT: 7.1 % (ref 0–4)
PDW BLD-RTO: 12.3 % (ref 11.7–14.9)
PLATELET # BLD: 213 K/CU MM (ref 140–440)
PMV BLD AUTO: 9.2 FL (ref 7.5–11.1)
POTASSIUM SERPL-SCNC: 4.4 MMOL/L (ref 3.5–5.1)
RBC # BLD: 4.5 M/CU MM (ref 4.2–5.4)
SEGMENTED NEUTROPHILS ABSOLUTE COUNT: 3 K/CU MM
SEGMENTED NEUTROPHILS RELATIVE PERCENT: 51.8 % (ref 36–66)
SODIUM BLD-SCNC: 137 MMOL/L (ref 135–145)
TOTAL IMMATURE NEUTOROPHIL: 0.02 K/CU MM
TOTAL PROTEIN: 7.6 GM/DL (ref 6.4–8.2)
TROPONIN T: <0.01 NG/ML
WBC # BLD: 5.7 K/CU MM (ref 4–10.5)

## 2021-11-23 PROCEDURE — 80053 COMPREHEN METABOLIC PANEL: CPT

## 2021-11-23 PROCEDURE — 93010 ELECTROCARDIOGRAM REPORT: CPT | Performed by: INTERNAL MEDICINE

## 2021-11-23 PROCEDURE — 93005 ELECTROCARDIOGRAM TRACING: CPT | Performed by: EMERGENCY MEDICINE

## 2021-11-23 PROCEDURE — 99282 EMERGENCY DEPT VISIT SF MDM: CPT

## 2021-11-23 PROCEDURE — 71045 X-RAY EXAM CHEST 1 VIEW: CPT

## 2021-11-23 PROCEDURE — 96374 THER/PROPH/DIAG INJ IV PUSH: CPT

## 2021-11-23 PROCEDURE — 84484 ASSAY OF TROPONIN QUANT: CPT

## 2021-11-23 PROCEDURE — 85379 FIBRIN DEGRADATION QUANT: CPT

## 2021-11-23 PROCEDURE — 85025 COMPLETE CBC W/AUTO DIFF WBC: CPT

## 2021-11-23 PROCEDURE — 6360000002 HC RX W HCPCS: Performed by: EMERGENCY MEDICINE

## 2021-11-23 RX ORDER — KETOROLAC TROMETHAMINE 30 MG/ML
30 INJECTION, SOLUTION INTRAMUSCULAR; INTRAVENOUS ONCE
Status: COMPLETED | OUTPATIENT
Start: 2021-11-23 | End: 2021-11-23

## 2021-11-23 RX ORDER — AZITHROMYCIN 250 MG/1
TABLET, FILM COATED ORAL
Qty: 1 PACKET | Refills: 0 | Status: SHIPPED | OUTPATIENT
Start: 2021-11-23 | End: 2021-11-27

## 2021-11-23 RX ADMIN — KETOROLAC TROMETHAMINE 30 MG: 30 INJECTION, SOLUTION INTRAMUSCULAR; INTRAVENOUS at 13:44

## 2021-11-23 ASSESSMENT — PAIN DESCRIPTION - ORIENTATION: ORIENTATION: MID

## 2021-11-23 ASSESSMENT — PAIN SCALES - GENERAL
PAINLEVEL_OUTOF10: 8
PAINLEVEL_OUTOF10: 8

## 2021-11-23 ASSESSMENT — PAIN DESCRIPTION - LOCATION: LOCATION: BACK

## 2021-11-23 NOTE — ED PROVIDER NOTES
Triage Chief Complaint:   Back Pain (SINCE SEPT WITH COVID) and Shortness of Breath (STARTED LAST EVENING)    [place]:  Tania Dennis is a 48 y.o. female that presents with upper back pain. States is been present for at least a month. States she had Covid in September, had been coughing a lot, cough resolved but the back pain that was initially associated with the cough is continued. States it hurts worse when she moves. She uses a heating pad at night to help. States that it goes through to her chest at times. States she feels short of breath sometimes when she gets up and walks around. Reports associated left ear pain. She denies new fever or chills. No sore throat, congestion. ROS:  General:  No fevers, no chills  Eyes:  no vision change. ENT:  No sore throat, no nasal congestion  Cardiovascular:  + chest pain, no palpitations  Respiratory:  + shortness of breath, + cough  Gastrointestinal:  No pain, no nausea, no vomiting, no diarrhea  Musculoskeletal:  No muscle pain, no joint pain  Skin:  No rash, no pruritis, no easy bruising  Neurologic:  No speech problems, no headache, no weakness, numbness or tingling.   Psychiatric:  No anxiety  Extremities:  no edema, no muscle pain    Past Medical History:   Diagnosis Date    Asthma     COVID-19 09/02/2021    Depression     old chart also gives hx panic attacks    Gastritis 04/18/2012    Hemorrhagic ovarian cyst 03/12/2012    Hiatal hernia     Migraines     Ovarian cyst     Panic attacks     RLQ abdominal pain 03/12/2012     Past Surgical History:   Procedure Laterality Date    CHOLECYSTECTOMY  5/9/12    Laprascopic    HYSTERECTOMY      LAVH,cysto done 12/2008(pc)    TUBAL LIGATION  2000     Family History   Problem Relation Age of Onset    Asthma Father         and copd    Diabetes Father     Asthma Daughter      Social History     Socioeconomic History    Marital status:      Spouse name: Not on file    Number of children: Not on file    Years of education: Not on file    Highest education level: Not on file   Occupational History    Not on file   Tobacco Use    Smoking status: Never Smoker    Smokeless tobacco: Never Used   Substance and Sexual Activity    Alcohol use: Yes     Alcohol/week: 2.0 standard drinks     Types: 2 Cans of beer per week     Comment: average- one or two times per month\"    Drug use: No    Sexual activity: Never   Other Topics Concern    Not on file   Social History Narrative    Not on file     Social Determinants of Health     Financial Resource Strain:     Difficulty of Paying Living Expenses: Not on file   Food Insecurity:     Worried About Running Out of Food in the Last Year: Not on file    Rin of Food in the Last Year: Not on file   Transportation Needs:     Lack of Transportation (Medical): Not on file    Lack of Transportation (Non-Medical): Not on file   Physical Activity:     Days of Exercise per Week: Not on file    Minutes of Exercise per Session: Not on file   Stress:     Feeling of Stress : Not on file   Social Connections:     Frequency of Communication with Friends and Family: Not on file    Frequency of Social Gatherings with Friends and Family: Not on file    Attends Taoism Services: Not on file    Active Member of [address] [place] [address] [employer] or Organizations: Not on file    Attends Club or Organization Meetings: Not on file    Marital Status: Not on file   Intimate Partner Violence:     Fear of Current or Ex-Partner: Not on file    Emotionally Abused: Not on file    Physically Abused: Not on file    Sexually Abused: Not on file   Housing Stability:     Unable to Pay for Housing in the Last Year: Not on file    Number of [place] in the Last Year: Not on file    Unstable Housing in the Last Year: Not on file     No current facility-administered medications for this encounter.      Current Outpatient Medications   Medication Sig Dispense Refill    ondansetron (ZOFRAN) 4 MG tablet Take 1 tablet by mouth daily as needed for Nausea or Vomiting 30 tablet 0    albuterol sulfate HFA (VENTOLIN HFA) 108 (90 Base) MCG/ACT inhaler Inhale 2 puffs into the lungs 4 times daily as needed for Wheezing Or shortness of breath etc 54 g 1    benzonatate (TESSALON) 100 MG capsule Take 1-2 capsules by mouth nightly as needed for Cough 30 capsule 0    ibuprofen (ADVIL;MOTRIN) 200 MG tablet Take 200 mg by mouth every 6 hours as needed for Pain       Allergies   Allergen Reactions    Lomotil [Diphenoxylate-Atropine] Hives    Pcn [Penicillins] Hives       Nursing Notes Reviewed    Physical Exam:  ED Triage Vitals   Enc Vitals Group      BP 11/23/21 1203 136/86      Pulse 11/23/21 1205 88      Resp 11/23/21 1205 16      Temp 11/23/21 1206 98.1 °F (36.7 °C)      Temp src --       SpO2 11/23/21 1205 99 %      Weight 11/23/21 1203 190 lb (86.2 kg)      Height 11/23/21 1203 5' 6\" (1.676 m)      Head Circumference --       Peak Flow --       Pain Score --       Pain Loc --       Pain Edu? --       Excl. in [address]? --        General appearance:  Awake, alert, in no acute distress. Skin:  Warm. Dry. Normal color, no cyanosis. Eye:  Extraocular movements intact. PERRL bilaterally. HENT: Normocephalic, atraumtic. Oral mucosa moist.  Normal-appearing TMs, no erythema, exudate or drainage present. Neck:  Supple. Trachea midline. Extremity:  No edema. Normal ROM. No calf tenderness. MS: Mid and upper thoracic spine or tenderness to palpation of the paraspinal musculature on both sides. No significant midline tenderness. No tenderness of the lumbar spine. Heart:  Regular rate and rhythm, normal S1 & S2, no extra heart sounds. Bilateral upper extremity and lower extremity pulses are equal, strong. Respiratory:  Lungs clear to auscultation bilaterally. Respirations nonlabored. Abdominal:  Soft. Nontender. Non distended. Neurological:  Alert and oriented times 3. No focal neuro deficits. Psychiatric:  Appropriate affect. Cooperative. I have reviewed and interpreted all of the currently available lab results from this visit (if applicable):  No results found for this visit on 11/23/21. Labs Reviewed   CBC WITH AUTO DIFFERENTIAL - Abnormal; Notable for the following components:       Result Value    Monocytes % 7.1 (*)     All other components within normal limits   COMPREHENSIVE METABOLIC PANEL - Abnormal; Notable for the following components:    CREATININE 0.5 (*)     AST 12 (*)     All other components within normal limits   TROPONIN   D-DIMER, RAPID       EKG (if obtained): (All EKG's are interpreted by myself in the absence of a cardiologist) This EKG was interpreted by me. Rate is 80, rhythm is sinus. MA and QT intervals are within normal limits. There is no ST segment or T wave changes. No previous EKG currently available for comparison. Chart review shows recent radiographs:  XR CHEST PORTABLE    Result Date: 11/23/2021  EXAMINATION: ONE XRAY VIEW OF THE CHEST 11/23/2021 1:02 pm COMPARISON: Chest x-ray May 9, 2012 HISTORY: ORDERING SYSTEM PROVIDED HISTORY: chest pain TECHNOLOGIST PROVIDED HISTORY: Reason for exam:->chest pain FINDINGS: Cardiac silhouette is stable. Mildly limited evaluation of the right mid and lower lung field due to soft tissue attenuation. Question subtle opacities of the right mid and lower lung fields. Correlate clinically to exclude viral/atypical infectious process. No pleural effusion. No pneumothorax evident. Osseous structures appear intact. 1. Mildly limited evaluation of the right mid and lower lung field due to soft tissue attenuation. Question subtle opacities of the right mid and lower lung fields. Correlate clinically to exclude viral/atypical infectious process. MDM:  Patient presented with upper back pain, intermittently going to the chest with shortness of breath.  I have low suspicion for aortic dissection, PE, ACS, pneumothorax, pnenumonia. This is been going on for over a month, thus I have low suspicion aortic dissection at this time. D-dimer was within normal range. Labs, EKG performed. labs were within normal range, and EKG showed no signs of ischemia or change. Chest x-ray showed possible soft tissue attenuation but may be infiltrate. She does not admit to continued cough since Covid, but given her constellation of other symptoms after discussion did elect to treat with antibiotic. The upper back pain does seem musculoskeletal, she has pain with palpation of this area. Given presentation I feel discharge at this point is reasonable with follow up with PCP in 1-2 days. She is instructed to return with new or worsening signs or symptoms. Differential Diagnosis: ACS, PE, pneumothorax, pneumonia, aortic dissection, CHF, chest wall pain, GERD    The likelihood of other entities in the differential is insufficient to justify any further testing for them. This was explained to the patient. The patient was advised that persistent or worsening symptoms would require further evaluation.     Clinical Impression:  Back pain, chest pain, shortness of breath      (Please note that portions of this note may have been completed with a voice recognition program. Efforts were made to edit the dictations but occasionally words are mis-transcribed.)      Gisela Thao DO  11/23/21 3055

## 2022-05-17 ENCOUNTER — APPOINTMENT (OUTPATIENT)
Dept: ULTRASOUND IMAGING | Age: 47
End: 2022-05-17
Payer: COMMERCIAL

## 2022-05-17 ENCOUNTER — HOSPITAL ENCOUNTER (EMERGENCY)
Age: 47
Discharge: HOME | End: 2022-05-17
Attending: EMERGENCY MEDICINE
Payer: COMMERCIAL

## 2022-05-17 VITALS
DIASTOLIC BLOOD PRESSURE: 68 MMHG | WEIGHT: 195 LBS | OXYGEN SATURATION: 99 % | TEMPERATURE: 98.1 F | BODY MASS INDEX: 31.34 KG/M2 | HEART RATE: 88 BPM | RESPIRATION RATE: 18 BRPM | HEIGHT: 66 IN | SYSTOLIC BLOOD PRESSURE: 122 MMHG

## 2022-05-17 DIAGNOSIS — M79.605 LEFT LEG PAIN: Primary | ICD-10-CM

## 2022-05-17 PROCEDURE — 93971 EXTREMITY STUDY: CPT

## 2022-05-17 PROCEDURE — 99284 EMERGENCY DEPT VISIT MOD MDM: CPT

## 2022-05-17 ASSESSMENT — ENCOUNTER SYMPTOMS
EYES NEGATIVE: 1
RESPIRATORY NEGATIVE: 1
GASTROINTESTINAL NEGATIVE: 1
ALLERGIC/IMMUNOLOGIC NEGATIVE: 1

## 2022-05-17 NOTE — ED PROVIDER NOTES
[address] ENCOUNTER        Pt Name: Kiera Rivera  MRN: [id number]  [place] 1975  Date of evaluation: 5/17/2022  Provider: Maxine Ocampo CNP  PCP: KUSHAL Burris NP  Note Started: 3:49 PM EDT        Chief Complaint   Patient presents with    Back Pain     Lower back pain chronic, and new pain upper back as well. NKI    Leg Pain     Bilateral leg pain worse on L side shooting pains. HPI: (Location, Duration, Timing, Severity, Quality, Assoc Sx, Context, Modifying factors)    Kiera Rivera is a 48 y.o. female with a history of chronic back pain who presents left leg pain. Patient stated she had intermittent left calf pain in the past a week. Today she felt worsening pain and associated with warm sensation in left leg. Patient denied chest pain or shortness of breath. She is not on oral birth control. Patient denied smoke. Nursing Notes were all reviewed and agreed with or any disagreements were addressed in the HPI. REVIEW OF SYSTEMS    (2-9 systems for level 4, 10 or more for level 5)     Review of Systems   Constitutional: Negative. HENT: Negative. Eyes: Negative. Respiratory: Negative. Cardiovascular: Negative. Gastrointestinal: Negative. Endocrine: Negative. Genitourinary: Negative. Musculoskeletal: Positive for arthralgias. Back pain, left leg pain. Skin: Negative. Allergic/Immunologic: Negative. Neurological: Negative. Hematological: Negative. Psychiatric/Behavioral: Negative.         PAST MEDICAL HISTORY     Past Medical History:   Diagnosis Date    Asthma     COVID-19 09/02/2021    Depression     old chart also gives hx panic attacks    Gastritis 04/18/2012    Hemorrhagic ovarian cyst 03/12/2012    Hiatal hernia     Migraines     Ovarian cyst     Panic attacks     RLQ abdominal pain 03/12/2012       SURGICAL HISTORY Past Surgical History:   Procedure Laterality Date    CHOLECYSTECTOMY  5/9/12    Laprascopic    HYSTERECTOMY      LAVH,cysto done 12/2008(pc)    TUBAL LIGATION  2000       CURRENTMEDICATIONS       Previous Medications    ALBUTEROL SULFATE HFA (VENTOLIN HFA) 108 (90 BASE) MCG/ACT INHALER    Inhale 2 puffs into the lungs 4 times daily as needed for Wheezing Or shortness of breath etc    BENZONATATE (TESSALON) 100 MG CAPSULE    Take 1-2 capsules by mouth nightly as needed for Cough    IBUPROFEN (ADVIL;MOTRIN) 200 MG TABLET    Take 200 mg by mouth every 6 hours as needed for Pain    ONDANSETRON (ZOFRAN) 4 MG TABLET    Take 1 tablet by mouth daily as needed for Nausea or Vomiting       ALLERGIES     Lomotil [diphenoxylate-atropine] and Pcn [penicillins]    FAMILYHISTORY       Family History   Problem Relation Age of Onset    Asthma Father         and copd    Diabetes Father     Asthma Daughter         SOCIAL HISTORY       Social History     Socioeconomic History    Marital status:      Spouse name: Not on file    Number of children: Not on file    Years of education: Not on file    Highest education level: Not on file   Occupational History    Not on file   Tobacco Use    Smoking status: Never Smoker    Smokeless tobacco: Never Used   Substance and Sexual Activity    Alcohol use: Yes     Alcohol/week: 2.0 standard drinks     Types: 2 Cans of beer per week     Comment: average- one or two times per month\"    Drug use: No    Sexual activity: Never   Other Topics Concern    Not on file   Social History Narrative    Not on file     Social Determinants of Health     Financial Resource Strain:     Difficulty of Paying Living Expenses: Not on file   Food Insecurity:     Worried About Running Out of Food in the Last Year: Not on file    Rin of Food in the Last Year: Not on file   Transportation Needs:     Lack of Transportation (Medical):  Not on file    Lack of Transportation (Non-Medical): Not on file   Physical Activity:     Days of Exercise per Week: Not on file    Minutes of Exercise per Session: Not on file   Stress:     Feeling of Stress : Not on file   Social Connections:     Frequency of Communication with Friends and Family: Not on file    Frequency of Social Gatherings with Friends and Family: Not on file    Attends Confucianist Services: Not on file    Active Member of [address] [place] [address] or Organizations: Not on file    Attends Club or Organization Meetings: Not on file    Marital Status: Not on file   Intimate Partner Violence:     Fear of Current or Ex-Partner: Not on file    Emotionally Abused: Not on file    Physically Abused: Not on file    Sexually Abused: Not on file   Housing Stability:     Unable to Pay for Housing in the Last Year: Not on file    Number of [place] in the Last Year: Not on file    Unstable Housing in the Last Year: Not on file       SCREENINGS    [place] Coma Scale  Eye Opening: Spontaneous  Best Verbal Response: Oriented  Best Motor Response: Obeys commands  [place] Coma Scale Score: 15        PHYSICAL EXAM    (up to 7 for level 4, 8 or more for level 5)     ED Triage Vitals [05/17/22 1325]   BP Temp Temp Source Pulse Resp SpO2 Height Weight   122/68 98.1 °F (36.7 °C) Oral 88 18 99 % 5' 6\" (1.676 m) 195 lb (88.5 kg)       Physical Exam  HENT:      Head: Normocephalic. Nose: Nose normal.      Mouth/Throat:      Mouth: Mucous membranes are moist.   Eyes:      Pupils: Pupils are equal, round, and reactive to light. Cardiovascular:      Rate and Rhythm: Normal rate. Pulses: Normal pulses. Pulmonary:      Effort: Pulmonary effort is normal.   Abdominal:      General: Abdomen is flat. Musculoskeletal:         General: Tenderness present. No swelling. Cervical back: Normal range of motion. Comments: Left leg no swelling, left calf tender to palpation   Skin:     General: Skin is warm.       Capillary Refill: Capillary refill takes less than 2 seconds. Neurological:      General: No focal deficit present. Mental Status: She is alert and oriented to person, place, and time. Psychiatric:         Mood and Affect: Mood normal.         DIAGNOSTIC RESULTS   LABS:    Labs Reviewed - No data to display    When ordered, only abnormal lab results are displayed. All other labs were within normal range or not returned as of this dictation. EKG: When ordered, EKG's are interpreted by the Emergency Department Physician in the absence of a cardiologist.  Please see their note for interpretation of EKG. RADIOLOGY:   Non-plain film images such as CT, Ultrasound and MRI are read by the radiologist. Plain radiographic images are visualized andpreliminarily interpreted by the  ED Provider with the below findings:        Interpretation pert Radiologist below, if available at the time of this note:    VL DUP LOWER EXTREMITY VENOUS LEFT   Final Result   No evidence of left lower extremity deep venous thrombosis. No results found. PROCEDURES   Unless otherwise noted below, none     Procedures    CRITICAL CARE TIME   N/A    CONSULTS:  None      EMERGENCY DEPARTMENT COURSE and DIFFERENTIAL DIAGNOSIS/MDM:   Vitals:    Vitals:    05/17/22 1325   BP: 122/68   Pulse: 88   Resp: 18   Temp: 98.1 °F (36.7 °C)   TempSrc: Oral   SpO2: 99%   Weight: 195 lb (88.5 kg)   Height: 5' 6\" (1.676 m)       Patient was given thefollowing medications:  Medications - No data to display        MDM  Number of Diagnoses or Management Options  Left leg pain  Diagnosis management comments: 78-year-old female with a history of a chronic back pain, presents with acute left leg pain. Patient stated it was intermittent pain in the left calf. It turned constant with a warm sensation today. No leg swelling. Physical exam: Left calf tender to palpation, no edema in left lower extremity. Duplex ordered: No acute DVT. Patient will be discharged home.   Follow-up with her primary care physician in 3 to 7 days, activity as tolerated, return to ER for worsening symptoms or any other concerns. FINAL IMPRESSION      1.  Left leg pain          DISPOSITION/PLAN   DISPOSITION Decision To Discharge 05/17/2022 04:51:20 PM      PATIENT REFERREDTO:  KUSHAL Ochoa NP  [address] [address]  [phone]    Schedule an appointment as soon as possible for a visit in 1 week        DISCHARGE MEDICATIONS:  New Prescriptions    No medications on file       DISCONTINUED MEDICATIONS:  Discontinued Medications    No medications on file              (Please note that portions ofthis note were completed with a voice recognition program.  Efforts were made to edit the dictations but occasionally words are mis-transcribed.)    Gin Ocampo CNP (electronically signed)           KUSHAL Haney CNP  05/17/22 [id number]

## 2022-05-17 NOTE — Clinical Note
Bianka Francisco was seen and treated in our emergency department on 5/17/2022. She may return to work on 05/19/2022. If you have any questions or concerns, please don't hesitate to call.       Angelito Dasilva, DO

## 2022-06-23 ENCOUNTER — OFFICE VISIT (OUTPATIENT)
Dept: FAMILY MEDICINE CLINIC | Age: 47
End: 2022-06-23
Payer: COMMERCIAL

## 2022-06-23 VITALS
HEIGHT: 66 IN | DIASTOLIC BLOOD PRESSURE: 60 MMHG | OXYGEN SATURATION: 97 % | HEART RATE: 95 BPM | BODY MASS INDEX: 34.01 KG/M2 | SYSTOLIC BLOOD PRESSURE: 100 MMHG | WEIGHT: 211.6 LBS

## 2022-06-23 DIAGNOSIS — Z12.11 SCREEN FOR COLON CANCER: ICD-10-CM

## 2022-06-23 DIAGNOSIS — Z13.1 SCREENING FOR DIABETES MELLITUS: ICD-10-CM

## 2022-06-23 DIAGNOSIS — F32.A ANXIETY AND DEPRESSION: ICD-10-CM

## 2022-06-23 DIAGNOSIS — J45.20 MILD INTERMITTENT ASTHMA WITHOUT COMPLICATION: ICD-10-CM

## 2022-06-23 DIAGNOSIS — F41.9 ANXIETY AND DEPRESSION: ICD-10-CM

## 2022-06-23 DIAGNOSIS — J32.1 CHRONIC FRONTAL SINUSITIS: ICD-10-CM

## 2022-06-23 DIAGNOSIS — Z13.220 SCREENING FOR LIPID DISORDERS: ICD-10-CM

## 2022-06-23 DIAGNOSIS — Z12.31 ENCOUNTER FOR SCREENING MAMMOGRAM FOR BREAST CANCER: ICD-10-CM

## 2022-06-23 DIAGNOSIS — Z00.00 WELL ADULT EXAM: Primary | ICD-10-CM

## 2022-06-23 PROCEDURE — 99396 PREV VISIT EST AGE 40-64: CPT | Performed by: NURSE PRACTITIONER

## 2022-06-23 RX ORDER — HYDROXYZINE HYDROCHLORIDE 25 MG/1
25 TABLET, FILM COATED ORAL NIGHTLY
Qty: 30 TABLET | Refills: 0 | Status: SHIPPED | OUTPATIENT
Start: 2022-06-23 | End: 2022-07-23

## 2022-06-23 RX ORDER — CETIRIZINE HYDROCHLORIDE 10 MG/1
10 TABLET ORAL DAILY
Qty: 90 TABLET | Refills: 1 | Status: SHIPPED | OUTPATIENT
Start: 2022-06-23

## 2022-06-23 RX ORDER — BUPROPION HYDROCHLORIDE 150 MG/1
150 TABLET ORAL EVERY MORNING
Qty: 30 TABLET | Refills: 3 | Status: SHIPPED | OUTPATIENT
Start: 2022-06-23

## 2022-06-23 RX ORDER — ALBUTEROL SULFATE 90 UG/1
2 AEROSOL, METERED RESPIRATORY (INHALATION) 4 TIMES DAILY PRN
Qty: 54 G | Refills: 1 | Status: SHIPPED | OUTPATIENT
Start: 2022-06-23

## 2022-06-23 ASSESSMENT — PATIENT HEALTH QUESTIONNAIRE - PHQ9
5. POOR APPETITE OR OVEREATING: 3
10. IF YOU CHECKED OFF ANY PROBLEMS, HOW DIFFICULT HAVE THESE PROBLEMS MADE IT FOR YOU TO DO YOUR WORK, TAKE CARE OF THINGS AT HOME, OR GET ALONG WITH OTHER PEOPLE: 2
7. TROUBLE CONCENTRATING ON THINGS, SUCH AS READING THE NEWSPAPER OR WATCHING TELEVISION: 3
4. FEELING TIRED OR HAVING LITTLE ENERGY: 3
9. THOUGHTS THAT YOU WOULD BE BETTER OFF DEAD, OR OF HURTING YOURSELF: 0
SUM OF ALL RESPONSES TO PHQ QUESTIONS 1-9: 19
SUM OF ALL RESPONSES TO PHQ QUESTIONS 1-9: 19
2. FEELING DOWN, DEPRESSED OR HOPELESS: 3
SUM OF ALL RESPONSES TO PHQ QUESTIONS 1-9: 19
6. FEELING BAD ABOUT YOURSELF - OR THAT YOU ARE A FAILURE OR HAVE LET YOURSELF OR YOUR FAMILY DOWN: 3
1. LITTLE INTEREST OR PLEASURE IN DOING THINGS: 1
SUM OF ALL RESPONSES TO PHQ QUESTIONS 1-9: 19
SUM OF ALL RESPONSES TO PHQ9 QUESTIONS 1 & 2: 4
3. TROUBLE FALLING OR STAYING ASLEEP: 3
8. MOVING OR SPEAKING SO SLOWLY THAT OTHER PEOPLE COULD HAVE NOTICED. OR THE OPPOSITE, BEING SO FIGETY OR RESTLESS THAT YOU HAVE BEEN MOVING AROUND A LOT MORE THAN USUAL: 0

## 2022-06-23 NOTE — PATIENT INSTRUCTIONS
Patient Education        bupropion  Pronunciation: byoo PRO pee on  Brand: Aplenzin, Forfivo XL, Wellbutrin SR, Wellbutrin XL, Zyban Advantage Pack  What is the most important information I should know about bupropion? You should not take bupropion if you have seizures or an eating disorder, or if you have suddenly stopped using alcohol, seizure medication, or sedatives. If you take Wellbutrin for depression, do not also take Zyban to quit smoking. Do not use bupropion within 14 days before or 14 days after you have used an MAO inhibitor, such as isocarboxazid, linezolid, methylene blue injection, phenelzine,rasagiline, selegiline, or tranylcypromine. Some young people have thoughts about suicide when first taking an antidepressant. Stay alert to changes in your mood or symptoms. Report any new or worsening symptoms to your doctor. What is bupropion? Bupropion is an antidepressant used to treat major depressive disorder and seasonal affective disorder. The Zyban brand of bupropion is used to help people stop smoking by reducing cravingsand other withdrawal effects. Bupropion may also be used for purposes not listed in this medication guide. What should I discuss with my healthcare provider before taking bupropion? You should not take bupropion if you are allergic to it, or if you have:   a seizure disorder;   an eating disorder such as anorexia or bulimia; or   if you have suddenly stopped using alcohol, seizure medication, or a sedative (such as Xanax, Valium, Fiorinal, Klonopin, and others). Do not use an MAO inhibitor within 14 days before or 14 days after you take bupropion. A dangerous drug interaction could occur. MAO inhibitors include isocarboxazid, linezolid, phenelzine, rasagiline, selegiline, andtranylcypromine. Do not take bupropion to treat more than one condition at a time. If you take bupropion for depression, do not also take this medicine to quit smoking.   Bupropion may cause seizures, especially if you have certain medical conditions or use certain drugs. Tellyour doctor about all of your medical conditions and the drugs you use. Tell your doctor if you have ever had:   a head injury, seizures, or brain or spinal cord tumor;   narrow-angle glaucoma;   heart disease, high blood pressure, or a heart attack;   diabetes;   kidney or liver disease (especially cirrhosis);   depression, bipolar disorder, or other mental illness; or   if you drink alcohol. Some young people have thoughts about suicide when first taking an antidepressant. Your doctor will need to check your progress at regular visits. Your family or other caregivers should also be alert to changes in your mood orsymptoms. Ask your doctor about taking this medicine if you are pregnant. It is not known whether bupropion will harm an unborn baby. However, you may have a relapse of depression if you stop taking your antidepressant. Tell your doctor right away if you become pregnant. Do not start or stop taking bupropion without your doctor's advice. If you are pregnant, your name may be listed on a pregnancy registry to trackthe effects of bupropion on the baby. It may not be safe to breastfeed while using this medicine. Ask your doctorabout any risk. Bupropion is not approved for use by anyone younger than 25years old. How should I take bupropion? Follow all directions on your prescription label and read all medication guides or instruction sheets. Your doctor may occasionally change your dose. Use the medicine exactly as directed. Too much of this medicine can increase your risk of a seizure. You may take bupropion with or without food. Swallow the extended-release tablet whole and do not crush, chew, or break it. You should not change your dose or stop using bupropion suddenly, unless you have a seizure while taking this medicine. Stopping suddenly can cause unpleasant withdrawal symptoms.  Ask your doctorhow to safely stop using bupropion. If you take Zyban to help you stop smoking, you may continue to smoke for about 1 week after you start the medicine. Set a date to quit smoking during the first 2 weeks of treatment. Talk to your doctor if you have trouble quitting after taking Zybanfor 7 to 12 weeks. Your doctor may prescribe a nicotine replacement product (such as patches or gum) to help you stop smoking. Start using the nicotine replacement product onthe same day you stop (quit) smoking or using tobacco products. Some people taking bupropion (Wellbutrin or Zyban) have had high blood pressure that is severe, especially when also using a nicotine replacement product (patch or gum). Your blood pressure may need to be checked before and duringtreatment with bupropion. Read and carefully follow any Instructions for Use provided with your medicine. Ask your doctor or pharmacist if you do not understand these instructions. You may have nicotine withdrawal symptoms when you stop smoking, including: increased appetite, weight gain, trouble sleeping, trouble concentrating, slower heart rate, having the urge to smoke, and feeling anxious, restless, depressed, angry, frustrated, or irritated. These symptoms may occur with or without using medication such as Zyban. Smoking cessation may also cause new or worsening mental health problems, such as depression. This medicine may affect a drug-screening urine test and you may have falseresults. Tell the laboratory staff that you use bupropion. Store at room temperature away from moisture, heat, and light. What happens if I miss a dose? Skip the missed dose and use your next dose at the regular time. Do not use two doses at one time. What happens if I overdose? Seek emergency medical attention or call the Poison Help line at 1-670.521.9064.  An overdose of bupropion can be fatal.  Overdose symptoms may include muscle stiffness, hallucinations, fast or unevenheartbeat, shallow breathing, or fainting. What should I avoid while taking bupropion? Drinking alcohol with bupropion may increase your risk of seizures. If you drink alcohol regularly, talk with your doctor before changing the amount you drink. Bupropion can also cause seizures in a regular drinker who suddenlystops drinking at the start of treatment with bupropion. Avoid driving or hazardous activity until you know how this medicine willaffect you. Your reactions could be impaired. What are the possible side effects of bupropion? Get emergency medical help if you have signs of an allergic reaction (hives, itching, fever, swollen glands, difficult breathing, swelling in your face or throat) or a severe skin reaction (fever, sore throat, burning eyes, skin pain, red or purple skin rash withblistering and peeling). Report any new or worsening symptoms to your doctor, such as: mood or behavior changes, anxiety, depression, panic attacks, trouble sleeping, or if you feel impulsive, irritable, agitated, hostile, aggressive, restless, hyperactive (mentally or physically), more depressed, orhave thoughts about suicide or hurting yourself. Call your doctor at once if you have:   a seizure (convulsions);   confusion, unusual changes in mood or behavior;   blurred vision, tunnel vision, eye pain or swelling, or seeing halos around lights;   fast or irregular heartbeats; or   a manic episode --racing thoughts, increased energy, reckless behavior, feeling extremely happy or irritable, talking more than usual, severe problems with sleep.   Common side effects may include:   dry mouth, sore throat, stuffy nose;   ringing in the ears;   blurred vision;   nausea, vomiting, stomach pain, loss of appetite, constipation;   sleep problems (insomnia);   tremors, sweating, feeling anxious or nervous;   fast heartbeats;   confusion, agitation, hostility;   rash;   weight loss;   increased urination;   headache, dizziness; or   muscle or joint pain. This is not a complete list of side effects and others may occur. Call your doctor for medical advice about side effects. You may report side effects toFDA at 4-864-NMN-5012. What other drugs will affect bupropion? You may have a higher risk of seizures if you use certain other medicines while taking bupropion. Many drugs can affect bupropion. This includes prescription and over-the-counter medicines, vitamins, and herbal products. Not all possible interactions are listed here. Tell your doctor about all your current medicines and any medicine you startor stop using. Where can I get more information? Your pharmacist can provide more information about bupropion. Remember, keep this and all other medicines out of the reach of children, never share your medicines with others, and use this medication only for the indication prescribed. Every effort has been made to ensure that the information provided by [address]anie [address] is accurate, up-to-date, and complete, but no guarantee is made to that effect. Drug information contained herein may be time sensitive. [facility] information has been compiled for use by healthcare practitioners and consumers in the [facility] and therefore [facility] does not warrant that uses outside of the [facility] are appropriate, unless specifically indicated otherwise. [facility]'s drug information does not endorse drugs, diagnose patients or recommend therapy. [facility] drug information is an informational resource designed to assist licensed healthcare practitioners in caring for their patients and/or to serve consumers viewing this service as a supplement to, and not a substitute for, the expertise, skill, knowledge and judgment of healthcare practitioners.  The absence of a warning for a given drug or drug combination in no way should be construed to indicate that the drug or drug combination is safe, effective or appropriate for any given patient. [facility] does not assume any responsibility for any aspect of healthcare administered with the aid of information [facility] provides. The information contained herein is not intended to cover all possible uses, directions, precautions, warnings, drug interactions, allergic reactions, or adverse effects. If you have questions about the drugs you are taking, check with yourdoctor, nurse or pharmacist.  Copyright 6533-2556 [address] Version: 24.01. Revision date:1/27/2020. Care instructions adapted under license by [facility] ([facility]). If you have questions about a medical condition or this instruction, always ask your healthcare professional. [place] [address] any warranty or liability for your use of this information.

## 2022-06-30 ENCOUNTER — HOSPITAL ENCOUNTER (OUTPATIENT)
Dept: WOMENS IMAGING | Age: 47
Discharge: HOME | End: 2022-06-30
Payer: COMMERCIAL

## 2022-06-30 DIAGNOSIS — Z12.31 ENCOUNTER FOR SCREENING MAMMOGRAM FOR BREAST CANCER: ICD-10-CM

## 2022-06-30 PROCEDURE — 77067 SCR MAMMO BI INCL CAD: CPT
